# Patient Record
Sex: FEMALE | Race: WHITE | NOT HISPANIC OR LATINO | Employment: FULL TIME | URBAN - METROPOLITAN AREA
[De-identification: names, ages, dates, MRNs, and addresses within clinical notes are randomized per-mention and may not be internally consistent; named-entity substitution may affect disease eponyms.]

---

## 2019-04-15 ENCOUNTER — OFFICE VISIT (OUTPATIENT)
Dept: FAMILY MEDICINE | Facility: CLINIC | Age: 34
End: 2019-04-15
Payer: COMMERCIAL

## 2019-04-15 ENCOUNTER — NURSE TRIAGE (OUTPATIENT)
Dept: NURSING | Facility: CLINIC | Age: 34
End: 2019-04-15

## 2019-04-15 VITALS
WEIGHT: 233.2 LBS | SYSTOLIC BLOOD PRESSURE: 124 MMHG | OXYGEN SATURATION: 98 % | RESPIRATION RATE: 16 BRPM | HEIGHT: 70 IN | DIASTOLIC BLOOD PRESSURE: 78 MMHG | TEMPERATURE: 97.6 F | HEART RATE: 73 BPM | BODY MASS INDEX: 33.39 KG/M2

## 2019-04-15 DIAGNOSIS — Z12.4 SCREENING FOR MALIGNANT NEOPLASM OF CERVIX: ICD-10-CM

## 2019-04-15 DIAGNOSIS — Z13.6 CARDIOVASCULAR SCREENING; LDL GOAL LESS THAN 160: ICD-10-CM

## 2019-04-15 DIAGNOSIS — Z00.00 ENCOUNTER FOR ROUTINE ADULT HEALTH EXAMINATION WITHOUT ABNORMAL FINDINGS: Primary | ICD-10-CM

## 2019-04-15 DIAGNOSIS — Z13.1 SCREENING FOR DIABETES MELLITUS: ICD-10-CM

## 2019-04-15 DIAGNOSIS — L65.9 HAIR THINNING: ICD-10-CM

## 2019-04-15 LAB
ALBUMIN SERPL-MCNC: 3.7 G/DL (ref 3.4–5)
ALP SERPL-CCNC: 72 U/L (ref 40–150)
ALT SERPL W P-5'-P-CCNC: 30 U/L (ref 0–50)
ANION GAP SERPL CALCULATED.3IONS-SCNC: 6 MMOL/L (ref 3–14)
AST SERPL W P-5'-P-CCNC: 22 U/L (ref 0–45)
BILIRUB SERPL-MCNC: 0.4 MG/DL (ref 0.2–1.3)
BUN SERPL-MCNC: 11 MG/DL (ref 7–30)
CALCIUM SERPL-MCNC: 9.1 MG/DL (ref 8.5–10.1)
CHLORIDE SERPL-SCNC: 111 MMOL/L (ref 94–109)
CHOLEST SERPL-MCNC: 157 MG/DL
CO2 SERPL-SCNC: 25 MMOL/L (ref 20–32)
CREAT SERPL-MCNC: 0.76 MG/DL (ref 0.52–1.04)
GFR SERPL CREATININE-BSD FRML MDRD: >90 ML/MIN/{1.73_M2}
GLUCOSE SERPL-MCNC: 98 MG/DL (ref 70–99)
HBA1C MFR BLD: 5.3 % (ref 0–5.6)
HDLC SERPL-MCNC: 49 MG/DL
LDLC SERPL CALC-MCNC: 91 MG/DL
NONHDLC SERPL-MCNC: 108 MG/DL
POTASSIUM SERPL-SCNC: 4.2 MMOL/L (ref 3.4–5.3)
PROT SERPL-MCNC: 7.2 G/DL (ref 6.8–8.8)
SODIUM SERPL-SCNC: 142 MMOL/L (ref 133–144)
TRIGL SERPL-MCNC: 85 MG/DL
TSH SERPL DL<=0.005 MIU/L-ACNC: 2.42 MU/L (ref 0.4–4)

## 2019-04-15 PROCEDURE — 80053 COMPREHEN METABOLIC PANEL: CPT | Performed by: NURSE PRACTITIONER

## 2019-04-15 PROCEDURE — G0145 SCR C/V CYTO,THINLAYER,RESCR: HCPCS | Performed by: NURSE PRACTITIONER

## 2019-04-15 PROCEDURE — 84443 ASSAY THYROID STIM HORMONE: CPT | Performed by: NURSE PRACTITIONER

## 2019-04-15 PROCEDURE — 99385 PREV VISIT NEW AGE 18-39: CPT | Performed by: NURSE PRACTITIONER

## 2019-04-15 PROCEDURE — 87624 HPV HI-RISK TYP POOLED RSLT: CPT | Performed by: NURSE PRACTITIONER

## 2019-04-15 PROCEDURE — 83036 HEMOGLOBIN GLYCOSYLATED A1C: CPT | Performed by: NURSE PRACTITIONER

## 2019-04-15 PROCEDURE — 80061 LIPID PANEL: CPT | Performed by: NURSE PRACTITIONER

## 2019-04-15 PROCEDURE — 36415 COLL VENOUS BLD VENIPUNCTURE: CPT | Performed by: NURSE PRACTITIONER

## 2019-04-15 ASSESSMENT — ENCOUNTER SYMPTOMS
HEADACHES: 0
NAUSEA: 0
HEMATURIA: 0
COUGH: 0
ABDOMINAL PAIN: 0
WEAKNESS: 0
HEMATOCHEZIA: 0
DYSURIA: 0
PARESTHESIAS: 0
MYALGIAS: 0
SORE THROAT: 0
BREAST MASS: 0
JOINT SWELLING: 0
DIZZINESS: 0
FREQUENCY: 0
DIARRHEA: 0
NERVOUS/ANXIOUS: 0
HEARTBURN: 0
ARTHRALGIAS: 0
PALPITATIONS: 0
SHORTNESS OF BREATH: 0
FEVER: 0
EYE PAIN: 0
CONSTIPATION: 0
CHILLS: 0

## 2019-04-15 ASSESSMENT — MIFFLIN-ST. JEOR: SCORE: 1830.1

## 2019-04-15 NOTE — TELEPHONE ENCOUNTER
Rivka calls and says that she wants to know her lab results from today. RN then checked Epic and because the  Has not read the results yet, this nurse could not tell pt. Her results. RN told pt. That pt. Can call her clinic and have the DrElin Read the results, so pt. Can get the results. Pt. Voiced understanding.    Additional Information    Negative: [1] Caller is not with the adult (patient) AND [2] reporting urgent symptoms    Negative: Lab result questions    Negative: Medication questions    Negative: Caller cannot be reached by phone    Negative: Caller has already spoken to PCP or another triager    Negative: RN needs further essential information from caller in order to complete triage    Negative: Requesting regular office appointment    Negative: [1] Caller requesting NON-URGENT health information AND [2] PCP's office is the best resource    Negative: Health Information question, no triage required and triager able to answer question    Negative: General information question, no triage required and triager able to answer question    Negative: Question about upcoming scheduled test, no triage required and triager able to answer question    Negative: [1] Caller is not with the adult (patient) AND [2] probable NON-URGENT symptoms    [1] Follow-up call to recent contact AND [2] information only call, no triage required    Protocols used: [facility]

## 2019-04-15 NOTE — PROGRESS NOTES
SUBJECTIVE:   CC: Ashley R Engelmann is an 33 year old woman who presents for preventive health visit.     Healthy Habits:     Getting at least 3 servings of Calcium per day:  Yes    Bi-annual eye exam:  NO    Dental care twice a year:  Yes    Sleep apnea or symptoms of sleep apnea:  None    Diet:  Regular (no restrictions)    Frequency of exercise:  4-5 days/week    Duration of exercise:  30-45 minutes    Taking medications regularly:  Yes    Medication side effects:  None    PHQ-2 Total Score: 0    Additional concerns today:  No    Has had some hair loss but is noticing regrowth.  Recent stress includes school, work, getting .  Now just started trying to conceive.  She feels she has tolerated the stress well.    Today's PHQ-2 Score:   PHQ-2 ( 1999 [employer]) 4/15/2019   Q1: Little interest or pleasure in doing things 0   Q2: Feeling down, depressed or hopeless 0   PHQ-2 Score 0   Q1: Little interest or pleasure in doing things Not at all   Q2: Feeling down, depressed or hopeless Not at all   PHQ-2 Score 0       Abuse: Current or Past(Physical, Sexual or Emotional)- No  Do you feel safe in your environment? Yes    Social History     Tobacco Use     Smoking status: Never Smoker     Smokeless tobacco: Never Used   Substance Use Topics     Alcohol use: Not Currently         Alcohol Use 4/15/2019   Prescreen: >3 drinks/day or >7 drinks/week? No       Reviewed orders with patient.  Reviewed health maintenance and updated orders accordingly - Yes  There is no problem list on file for this patient.    History reviewed. No pertinent surgical history.    Social History     Tobacco Use     Smoking status: Never Smoker     Smokeless tobacco: Never Used   Substance Use Topics     Alcohol use: Not Currently     Family History   Family history unknown: Yes           Mammogram not appropriate for this patient based on age.    Pertinent mammograms are reviewed under the imaging tab.  History of abnormal Pap smear: NO - age  "30-65 PAP every 5 years with negative HPV co-testing recommended     Reviewed and updated as needed this visit by clinical staff  Tobacco  Allergies  Meds  Problems  Med Hx  Surg Hx  Fam Hx  Soc Hx          Reviewed and updated as needed this visit by Provider  Tobacco  Allergies  Meds  Problems  Med Hx  Surg Hx  Fam Hx            Review of Systems   Constitutional: Negative for chills and fever.   HENT: Negative for congestion, ear pain, hearing loss and sore throat.    Eyes: Negative for pain and visual disturbance.   Respiratory: Negative for cough and shortness of breath.    Cardiovascular: Negative for chest pain, palpitations and peripheral edema.   Gastrointestinal: Negative for abdominal pain, constipation, diarrhea, heartburn, hematochezia and nausea.   Breasts:  Negative for tenderness, breast mass and discharge.   Genitourinary: Negative for dysuria, frequency, genital sores, hematuria, pelvic pain, urgency, vaginal bleeding and vaginal discharge.   Musculoskeletal: Negative for arthralgias, joint swelling and myalgias.   Skin: Negative for rash.   Neurological: Negative for dizziness, weakness, headaches and paresthesias.   Psychiatric/Behavioral: Negative for mood changes. The patient is not nervous/anxious.           OBJECTIVE:   /78 (BP Location: Right arm, Patient Position: Chair, Cuff Size: Adult Large)   Pulse 73   Temp 97.6  F (36.4  C) (Tympanic)   Resp 16   Ht 1.765 m (5' 9.5\")   Wt 105.8 kg (233 lb 3.2 oz)   LMP 03/25/2019 (Exact Date)   SpO2 98%   BMI 33.94 kg/m    Physical Exam  GENERAL: healthy, alert and no distress  EYES: Eyes grossly normal to inspection, PERRL and conjunctivae and sclerae normal  HENT: ear canals and TM's normal, nose and mouth without ulcers or lesions  NECK: no adenopathy, no asymmetry, masses, or scars and thyroid normal to palpation  RESP: lungs clear to auscultation - no rales, rhonchi or wheezes  BREAST: normal without masses, tenderness " "or nipple discharge and no palpable axillary masses or adenopathy  CV: regular rate and rhythm, normal S1 S2, no S3 or S4, no murmur, click or rub, no peripheral edema and peripheral pulses strong  ABDOMEN: soft, nontender, no hepatosplenomegaly, no masses and bowel sounds normal   (female): normal female external genitalia, normal urethral meatus, vaginal mucosa pink, moist, well rugated, and normal cervix/adnexa/uterus without masses or discharge  NEURO: Normal strength and tone, mentation intact and speech normal  SKIN:  Thinning hair over crown, new growth seen along hair line.  PSYCH: mentation appears normal, affect normal/bright    Diagnostic Test Results:  none     ASSESSMENT/PLAN:   1. Encounter for routine adult health examination without abnormal findings  - Comprehensive metabolic panel    2. Screening for malignant neoplasm of cervix  - Pap imaged thin layer screen with HPV - recommended age 30 - 65  - HPV High Risk Types DNA Cervical    3. Screening for diabetes mellitus  - Comprehensive metabolic panel  - Hemoglobin A1c    4. CARDIOVASCULAR SCREENING; LDL GOAL LESS THAN 160  - Comprehensive metabolic panel  - Lipid panel reflex to direct LDL Fasting    5. Hair thinning  Likely due to stress, telogen effluvium discussed.  New growth seen.  Check TSH, monitor.  - TSH with free T4 reflex    COUNSELING:  Reviewed preventive health counseling, as reflected in patient instructions    BP Readings from Last 1 Encounters:   04/15/19 124/78     Estimated body mass index is 33.94 kg/m  as calculated from the following:    Height as of this encounter: 1.765 m (5' 9.5\").    Weight as of this encounter: 105.8 kg (233 lb 3.2 oz).      Weight management plan: Discussed healthy diet and exercise guidelines     reports that she has never smoked. She has never used smokeless tobacco.      Counseling Resources:  ATP IV Guidelines  Pooled Cohorts Equation Calculator  Breast Cancer Risk Calculator  FRAX Risk " Assessment  ICSI Preventive Guidelines  Dietary Guidelines for Americans, 2010  USDA's MyPlate  ASA Prophylaxis  Lung CA Screening    ALMA Valderrama Ra, CNP  [facility]

## 2019-04-17 LAB
COPATH REPORT: NORMAL
PAP: NORMAL

## 2019-04-19 LAB
FINAL DIAGNOSIS: ABNORMAL
HPV HR 12 DNA CVX QL NAA+PROBE: POSITIVE
HPV16 DNA SPEC QL NAA+PROBE: NEGATIVE
HPV18 DNA SPEC QL NAA+PROBE: NEGATIVE
SPECIMEN DESCRIPTION: ABNORMAL
SPECIMEN SOURCE CVX/VAG CYTO: ABNORMAL

## 2019-04-23 ENCOUNTER — RESULT FOLLOW UP (OUTPATIENT)
Dept: FAMILY MEDICINE | Facility: CLINIC | Age: 34
End: 2019-04-23

## 2019-04-23 DIAGNOSIS — R87.810 CERVICAL HIGH RISK HPV (HUMAN PAPILLOMAVIRUS) TEST POSITIVE: ICD-10-CM

## 2019-04-23 NOTE — PROGRESS NOTES
4/15/19 NIL, +HR HPV, not 16/18. Plan 1 yr co-test due by 4/15/20  4/23/19 Left message to call back. Patient has been notified of results and recommendations.  3/13/20 NIL pap, neg HR HPV. Plan 3 year cotest (adam)

## 2019-06-11 ENCOUNTER — PRENATAL OFFICE VISIT (OUTPATIENT)
Dept: NURSING | Facility: CLINIC | Age: 34
End: 2019-06-11
Payer: COMMERCIAL

## 2019-06-11 VITALS
DIASTOLIC BLOOD PRESSURE: 60 MMHG | WEIGHT: 236 LBS | SYSTOLIC BLOOD PRESSURE: 100 MMHG | HEART RATE: 72 BPM | BODY MASS INDEX: 33.79 KG/M2 | HEIGHT: 70 IN

## 2019-06-11 DIAGNOSIS — O09.511 SUPERVISION OF HIGH RISK PRIMIGRAVIDA IN FIRST TRIMESTER IN PATIENT 35 YEARS OR OLDER AT TIME OF DELIVERY: Primary | ICD-10-CM

## 2019-06-11 LAB
ERYTHROCYTE [DISTWIDTH] IN BLOOD BY AUTOMATED COUNT: 13.8 % (ref 10–15)
HCT VFR BLD AUTO: 36.7 % (ref 35–47)
HGB BLD-MCNC: 12.3 G/DL (ref 11.7–15.7)
MCH RBC QN AUTO: 30.3 PG (ref 26.5–33)
MCHC RBC AUTO-ENTMCNC: 33.5 G/DL (ref 31.5–36.5)
MCV RBC AUTO: 90 FL (ref 78–100)
PLATELET # BLD AUTO: 280 10E9/L (ref 150–450)
RBC # BLD AUTO: 4.06 10E12/L (ref 3.8–5.2)
WBC # BLD AUTO: 11.3 10E9/L (ref 4–11)

## 2019-06-11 PROCEDURE — 87591 N.GONORRHOEAE DNA AMP PROB: CPT | Performed by: ADVANCED PRACTICE MIDWIFE

## 2019-06-11 PROCEDURE — 99207 ZZC NO CHARGE NURSE ONLY: CPT

## 2019-06-11 PROCEDURE — 36415 COLL VENOUS BLD VENIPUNCTURE: CPT | Performed by: ADVANCED PRACTICE MIDWIFE

## 2019-06-11 PROCEDURE — 87491 CHLMYD TRACH DNA AMP PROBE: CPT | Performed by: ADVANCED PRACTICE MIDWIFE

## 2019-06-11 PROCEDURE — 86900 BLOOD TYPING SEROLOGIC ABO: CPT | Performed by: ADVANCED PRACTICE MIDWIFE

## 2019-06-11 PROCEDURE — 87340 HEPATITIS B SURFACE AG IA: CPT | Performed by: ADVANCED PRACTICE MIDWIFE

## 2019-06-11 PROCEDURE — 86780 TREPONEMA PALLIDUM: CPT | Performed by: ADVANCED PRACTICE MIDWIFE

## 2019-06-11 PROCEDURE — 86901 BLOOD TYPING SEROLOGIC RH(D): CPT | Performed by: ADVANCED PRACTICE MIDWIFE

## 2019-06-11 PROCEDURE — 85027 COMPLETE CBC AUTOMATED: CPT | Performed by: ADVANCED PRACTICE MIDWIFE

## 2019-06-11 PROCEDURE — 86850 RBC ANTIBODY SCREEN: CPT | Performed by: ADVANCED PRACTICE MIDWIFE

## 2019-06-11 PROCEDURE — 86762 RUBELLA ANTIBODY: CPT | Performed by: ADVANCED PRACTICE MIDWIFE

## 2019-06-11 PROCEDURE — 87086 URINE CULTURE/COLONY COUNT: CPT | Performed by: ADVANCED PRACTICE MIDWIFE

## 2019-06-11 PROCEDURE — 87389 HIV-1 AG W/HIV-1&-2 AB AG IA: CPT | Performed by: ADVANCED PRACTICE MIDWIFE

## 2019-06-11 RX ORDER — PRENATAL VIT/IRON FUM/FOLIC AC 27MG-0.8MG
1 TABLET ORAL DAILY
Qty: 90 TABLET | Refills: 3
Start: 2019-06-11

## 2019-06-11 SDOH — ECONOMIC STABILITY: FOOD INSECURITY: WITHIN THE PAST 12 MONTHS, THE FOOD YOU BOUGHT JUST DIDN'T LAST AND YOU DIDN'T HAVE MONEY TO GET MORE.: NEVER TRUE

## 2019-06-11 SDOH — ECONOMIC STABILITY: FOOD INSECURITY: WITHIN THE PAST 12 MONTHS, YOU WORRIED THAT YOUR FOOD WOULD RUN OUT BEFORE YOU GOT MONEY TO BUY MORE.: NEVER TRUE

## 2019-06-11 SDOH — ECONOMIC STABILITY: TRANSPORTATION INSECURITY
IN THE PAST 12 MONTHS, HAS LACK OF TRANSPORTATION KEPT YOU FROM MEETINGS, WORK, OR FROM GETTING THINGS NEEDED FOR DAILY LIVING?: NO

## 2019-06-11 SDOH — ECONOMIC STABILITY: TRANSPORTATION INSECURITY
IN THE PAST 12 MONTHS, HAS THE LACK OF TRANSPORTATION KEPT YOU FROM MEDICAL APPOINTMENTS OR FROM GETTING MEDICATIONS?: NO

## 2019-06-11 SDOH — ECONOMIC STABILITY: INCOME INSECURITY: HOW HARD IS IT FOR YOU TO PAY FOR THE VERY BASICS LIKE FOOD, HOUSING, MEDICAL CARE, AND HEATING?: NOT HARD AT ALL

## 2019-06-11 ASSESSMENT — MIFFLIN-ST. JEOR: SCORE: 1842.8

## 2019-06-11 NOTE — PROGRESS NOTES
"Chief Complaint   Patient presents with     Prenatal Care     New Prenatal Nurse Visit   7w1d  Estimated Date of Delivery: 2020      Initial /60 (BP Location: Right arm, Cuff Size: Adult Large)   Pulse 72   Ht 1.765 m (5' 9.5\")   Wt 107 kg (236 lb)   LMP 2019 (Exact Date)   BMI 34.35 kg/m   Estimated body mass index is 34.35 kg/m  as calculated from the following:    Height as of this encounter: 1.765 m (5' 9.5\").    Weight as of this encounter: 107 kg (236 lb).  BP completed using cuff size: regular    Questioned patient about current smoking habits.  Pt. has never smoked.    Prenatal book and folder (containing standard educational hand-outs and brochures) given to patient. Information in folder reviewed. Questions answered. Brochure given on optional screening available to assess chromosomal anomalies. Pt advised to call the clinic if she has any questions or concerns related to her pregnancy. Prenatal labs obtained. New prenatal visit scheduled on 19 with Renita Marrufo CNM.    7w1d    Lab Results   Component Value Date    PAP NIL 04/15/2019           Patient supplied answers from flow sheet for:  Prenatal OB Questionnaire.  Past Medical History  Diabetes?: No  Hypertension : No  Heart disease, mitral valve prolapse or rheumatic fever?: No  An autoimmune disease such as lupus or rheumatoid arthritis?: No  Kidney disease or urinary tract infection?: No  Epilepsy, seizures or spells?: No  Migraine headaches?: No  A stroke or loss of function or sensation?: No  Any other neurological problems?: No  Have you ever been treated for depression?: No  Are you having problems with crying spells or loss of self-esteem?: No  Have you ever required psychiatric care?: No  Have you ever had hepatitis, liver disease or jaundice?: No  Have you been treated for blood clots in your veins, deep vein thromosis, inflammation in the veins, thrombosis, phlebitis, pulmonary embolism or varicosities?: " No  Have you had excessive bleeding after surgery or dental work?: No  Do you bleed more than other women after a cut or scratch?: No  Do you have a history of anemia?: No  Have you ever had thyroid problems or taken thyroid medication?: No   Do you have any endocrine problems?: No  Have you ever been in a major accident or suffered serious trauma?: No  Within the last year, has anyone hit, slapped, kicked or otherwise hurt you?: No  In the last year, has anyone forced you to have sex when you didn't want to?: No    Past Medical History 2   Have you ever received a blood transfusion?: No  Would you refuse a blood transfusion if a doctor judged it to be medically necessary?: No   If you answered Yes, would you rather die than receive a blood transfusion?: Unknown  If you answered Yes, is this for Zoroastrianism reasons?: Unknown  Does anyone in your home smoke?: No  Do you use tobacco products?: No  Do you drink beer, wine or hard liquor?: No(not after + pregnancy test)  Do you use any of the following: marijuana, speed, cocaine, heroin, hallucinogens or other drugs?: No   Is your blood type Rh negative?: No  Have you ever had abnormal antibodies in your blood?: No  Have you ever had asthma?: No  Have you ever had tuberculosis?: No  Do you have any allergies to drugs or over-the-counter medications?: No  Allergies: Dust Mites, Aspartame, Ethanol, Venlafaxine, Hydrochloride, Sertraline: No  Have you had any breast problems?: No  Have you ever ?: No  Have you had any gynecological surgical procedures such as cervical conization, a LEEP procedure, laser treatment, cryosurgery of the cervix or a dilation and curettage, etc?: No  Have you ever had any other surgical procedures?: (see surgical history)  Have you been hospitalized for a nonsurgical reason excluding normal delivery?: No  Have you ever had any anesthetic complications?: No  Have you ever had an abnormal pap smear?: No    Past Medical History  (Continued)  Do you have a history of abnormalities of the uterus?: (pap normal, but tested + for HR HPV)  Did your mother take SHAHRAM or any other hormones when she was pregnant with you?: Unknown  Did it take you more than a year to become pregnant?: No  Have you ever been evaluated or treated for infertility?: No  Is there a history of medical problems in your family, which you feel may be important to this pregnancy?: Unknown  Do you have any other problems we have not asked about which you feel may be important to this pregnancy?: No    Symptoms since last menstrual period  Do you have any of the following symptoms: abdominal pain, blood in stools or urine, chest pain, shortness of breath, coughing or vomiting up blood, your heart racing or skipping beats, nausea and vomiting, pain on urination or vaginal discharge or bleed: No  Current medications, including over-the-counter medications, you are using? (If not applicable answer none): Prenatal vitamin  Will the patient be 35 years old or older at the time of delivery?: (!) Yes    Has the patient, baby's father or anyone in either family had:  Thalassemia (Italian, Greek, Mediterranean or  background only) and an MCV result less than 80?: No  Neural tube defect such as meningomyelocele, spina bifida or anencephaly?: No  Congenital heart defect?: No  Down's Syndrome?: No  Cj-Sachs disease (Adventist, Cajun, Croatian-Paraguayan)?: No  Sickle cell disease or trait ()?: No  Hemophilia or other inherited problems of blood?: No  Muscular dystrophy?: No  Cystic fibrosis?: No  [place]'s chorea?: No  Mental retardation/autism?: No  If yes, was the person tested for fragile X?: No  Any other inherited genetic or chromosomal disorder?: No  Maternal metabolic disorder (e.g Insulin-dependent diabetes, PKU)?: No  A child with birth defects not listed above?: No  Recurrent pregnancy loss or stillbirth?: No   Has the patient had any medications/street drugs/alcohol since  her last menstrual period?: No  Does the patient or baby's father have any other genetic risks?: No    Infection History   Do you object to being tested for Hepatitis B?: No  Do you object to being tested for HIV?: No   Do you feel that you are at high risk for coming in contact with the AIDS virus?: No  Have you ever been treated for tuberculosis?: No  Have you ever had a positive skin test for tuberculosis?: No  Do you live with someone who has tuberculosis?: No  Have you ever been exposed to tuberculosis?: No  Do you have genital herpes?: No  Does your partner have genital herpes?: No  Have you had a viral illness since your last period?: No  Have you ever had gonorrhea, chlamydia, syphilis, venereal warts, trichomoniasis, pelvic inflammatory disease or any other sexually transmitted disease?: (!) Yes  Do you know if you are a genital group B streptococcus carrier?: (+ HPV)  Have you had chicken pox/varicella?: (!) Yes   Have you been vaccinated against chicken Pox?: No  Have you had any other infectious diseases?: No    Christiana Dsouza RN

## 2019-06-12 LAB
ABO + RH BLD: NORMAL
ABO + RH BLD: NORMAL
BACTERIA SPEC CULT: NO GROWTH
BLD GP AB SCN SERPL QL: NORMAL
BLOOD BANK CMNT PATIENT-IMP: NORMAL
HBV SURFACE AG SERPL QL IA: NONREACTIVE
HIV 1+2 AB+HIV1 P24 AG SERPL QL IA: NONREACTIVE
SPECIMEN EXP DATE BLD: NORMAL
SPECIMEN SOURCE: NORMAL

## 2019-06-13 LAB
C TRACH DNA SPEC QL NAA+PROBE: NEGATIVE
N GONORRHOEA DNA SPEC QL NAA+PROBE: NEGATIVE
RUBV IGG SERPL IA-ACNC: 33 IU/ML
SPECIMEN SOURCE: NORMAL
SPECIMEN SOURCE: NORMAL
T PALLIDUM AB SER QL: NONREACTIVE

## 2019-06-20 DIAGNOSIS — O09.511 SUPERVISION OF HIGH RISK PRIMIGRAVIDA IN FIRST TRIMESTER IN PATIENT 35 YEARS OR OLDER AT TIME OF DELIVERY: ICD-10-CM

## 2019-06-26 ENCOUNTER — PRENATAL OFFICE VISIT (OUTPATIENT)
Dept: OBGYN | Facility: CLINIC | Age: 34
End: 2019-06-26
Payer: COMMERCIAL

## 2019-06-26 VITALS — DIASTOLIC BLOOD PRESSURE: 74 MMHG | WEIGHT: 234.6 LBS | BODY MASS INDEX: 34.15 KG/M2 | SYSTOLIC BLOOD PRESSURE: 102 MMHG

## 2019-06-26 DIAGNOSIS — Z34.01 SUPERVISION OF LOW-RISK FIRST PREGNANCY, FIRST TRIMESTER: Primary | ICD-10-CM

## 2019-06-26 DIAGNOSIS — O09.529 AMA (ADVANCED MATERNAL AGE) MULTIGRAVIDA 35+, UNSPECIFIED TRIMESTER: ICD-10-CM

## 2019-06-26 PROCEDURE — 99207 ZZC FIRST OB VISIT: CPT | Performed by: ADVANCED PRACTICE MIDWIFE

## 2019-06-26 NOTE — NURSING NOTE
"Chief Complaint   Patient presents with     Prenatal Care     NPN     Pap and G/C done       Initial /74 (BP Location: Left arm, Patient Position: Chair, Cuff Size: Adult Large)   Wt 106.4 kg (234 lb 9.6 oz)   LMP 2019 (Exact Date)   Breastfeeding? No   BMI 34.15 kg/m   Estimated body mass index is 34.15 kg/m  as calculated from the following:    Height as of 19: 1.765 m (5' 9.5\").    Weight as of this encounter: 106.4 kg (234 lb 9.6 oz).  BP completed using cuff size: large    Questioned patient about current smoking habits.  Pt. has never smoked.    9w2d      The following HM Due: NONE      +Some nausea and vomiting  +Fatigue    +Breast tenderness        Yoon Mckenzie, CMA on 2019 at 8:34 AM    "

## 2019-06-26 NOTE — PROGRESS NOTES
Ashley R Engelmann is a 33 year old  ,  who is not a previous CNM patient. She presents for a new OB Visit. This was a planned pregnancy.     FOB is Andrew who is in good health.  FOB IS actively involved in relationship and this pregnancy.    She has not had bleeding since her LMP.    She mild stretching abdominal pain since her LMP.   She has had nausea.  has had vomiting.  Any personal or family history of blood clots? Doesn't know, patient was adopted   History of sickle cell anemia or trait? Doesn't know          Patient's last menstrual period was 2019 (exact date)..  Estimated Date of Delivery: 2020 Ultrasound consistent with LMP.    MENSTRUAL HISTORY    frequency: every 28 days  Last PAP:  4/15/19  History of abnormal Pap?  Yes: +HPV    Health maintenance updated:  yes        Current medications are:    Current Outpatient Medications:      Prenatal Vit-Fe Fumarate-FA (PRENATAL MULTIVITAMIN W/IRON) 27-0.8 MG tablet, Take 1 tablet by mouth daily, Disp: 90 tablet, Rfl: 3     INFECTION HISTORY  HIV: No  Hepatitis B: No  Hepatitis C: No  Tuberculosis: No    Genital Herpes self: no  Herpes partner:  no  Chlamydia:  no  Gonorrhea:  no  HPV: No  BV:  No  Syphilis:  No  Chicken Pox:  Yes - Had had       OB HISTORY  OB History    Para Term  AB Living   1 0 0 0 0 0   SAB TAB Ectopic Multiple Live Births   0 0 0 0 0      # Outcome Date GA Lbr Jaciel/2nd Weight Sex Delivery Anes PTL Lv   1 Current                History of GDM: No,  PTL : No,  History of HTN in pregnancy: No,  Thrombocytopenia: No,  Shoulder dystocia: No,  Vacuum Extraction: No  PPH: No   3rd of 4th degree laceration: No.   Other complications: No    PERSONAL HISTORY  Exercise Habits:  walking 4-7 days per week.  Employment: Full time.  Her job involves light activity with little potential for toxic exposure.    Travel plans:  are intra [place] air travel.    Diet: follows a balanced nutrition diet  Abuse  concerns? Did not assess, patient  present   Hgb A1c screen:  BMI > 30: Yes, 1st degree family DM: No, History of GDM: No, PCOS: No, High risk ethnicity: No    Social History     Socioeconomic History     Marital status:      Spouse name: Andrew     Number of children: Not on file     Years of education: Not on file     Highest education level: Master's degree (e.g., MA, MS, Henrry, MEd, MSW, JOANN)   Occupational History     Occupation: RN     Employer: [employer]     Occupation: teacher     Employer: [employer]   Social Needs     Financial resource strain: Not hard at all     Food insecurity:     Worry: Never true     Inability: Never true     Transportation needs:     Medical: No     Non-medical: No   Tobacco Use     Smoking status: Never Smoker     Smokeless tobacco: Never Used   Substance and Sexual Activity     Alcohol use: Not Currently     Drug use: Never     Sexual activity: Yes     Partners: Male     Birth control/protection: None   Lifestyle     Physical activity:     Days per week: Not on file     Minutes per session: Not on file     Stress: Not on file   Relationships     Social connections:     Talks on phone: Not on file     Gets together: Not on file     Attends Holiness service: Not on file     Active member of club or organization: Not on file     Attends meetings of clubs or organizations: Not on file     Relationship status: Not on file     Intimate partner violence:     Fear of current or ex partner: Not on file     Emotionally abused: Not on file     Physically abused: Not on file     Forced sexual activity: Not on file   Other Topics Concern     Not on file   Social History Narrative     Not on file       She  reports that she has never smoked. She has never used smokeless tobacco.    STD testing offered?  Accepted  Last PHQ-9 score on record = No flowsheet data found.  Last GAD7 score on record = No flowsheet data found.  Alcohol Score = Not this pregnancy   Referral/Meds needed?  Yes, gave recommendation for unisom and B6    PAST MEDICAL/SURGICAL HISTORY  Past Medical History:   Diagnosis Date     Cervical high risk HPV (human papillomavirus) test positive 04/15/2019    See problem list     Past Surgical History:   Procedure Laterality Date     FRACTURE TX, WRIST RT/LT       TONSILLECTOMY & ADENOIDECTOMY         FAMILY HISTORY  Family History   Adopted: Yes   Problem Relation Age of Onset     Diabetes Other         was told H/O DM  birth mothers grandparents       ROS:  12 point review of systems negative other than symptoms noted below.    PHYSICAL EXAM  Vitals: /74 (BP Location: Left arm, Patient Position: Chair, Cuff Size: Adult Large)   Wt 106.4 kg (234 lb 9.6 oz)   LMP 2019 (Exact Date)   Breastfeeding? No   BMI 34.15 kg/m    BMI= Body mass index is 34.15 kg/m .     GENERAL:  33 year old pleasant pregnant female, alert, cooperative and well groomed.  NECK:  Thyroid without enlargement and nodules.  Lymph nodes not palpable.   LUNGS:  Clear to auscultation.  BREAST:  deferred   HEART:  RRR without murmur.  ABDOMEN: Soft without masses or tenderness.  No scars noted..  GENITALIA: deferred   CERVIX:  Deferred   UTERUS: likely Retroverted, nontender 9 weeks in size.  ADNEXA: Without masses or tenderness  RECTAL:  Normal appearance.  Digital exam deferred.  LOWER EXTREMITIES: No edema. No significant varicosities.    ASSESSMENT/PLAN:    IUP at 9w2d     consult for US for AMA patients: Yes reviewed risk and genetic screening   Genetic Testing reviewed and discussed, patient desires to discuss with , will return to clinic 1 week if decide to complete progenity. Handout provided    COUNSELING    Instructed on use of triage nurse line and contacting the on call CNM after hours in an emergency.     Symptoms of N&V and fatigue usually start to resolve around 12-16 weeks     Reviewed CNM philosophy, call schedule for labor and delivery, and [facility] for delivery    1st OB  handout given outlining appointment spacing and CNM information    Reviewed exercise and nutrition    Recommend to gain 20 pounds with her pregnancy.    Discussed OTC medications. OB med list given    Encouraged patient to arrange  if needed    Encouraged patient to take PNV's/DHA    Travel precautions discussed, no air travel after 36 weeks and Zika Virus discussed    Will call patient with lab results when available    F/U to be addressed next visit:  return to clinic 4 weeks, considering progenity consent signedt, Rx's given, referrals    Will return to the clinic in 4 weeks for her next routine prenatal check.  Will call to be seen sooner if problems arise.    Renita Marrufo, DNP, APRN, CNM, IBCLC

## 2019-07-02 ENCOUNTER — TELEPHONE (OUTPATIENT)
Dept: OBGYN | Facility: CLINIC | Age: 34
End: 2019-07-02

## 2019-07-02 DIAGNOSIS — Z34.01 SUPERVISION OF LOW-RISK FIRST PREGNANCY, FIRST TRIMESTER: Primary | ICD-10-CM

## 2019-07-02 NOTE — TELEPHONE ENCOUNTER
Future orders placed for CNM IgG and IgM labs. Okay to draw with [employer] (acquired by [employer]). Sent My chart message to notify patient.

## 2019-07-02 NOTE — TELEPHONE ENCOUNTER
Pt calling and requesting an order for CMV exposure labs. 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 She would like to have labs done to see if she has been exposed to it. She will be coming in for her progenity lab work on 7/8 and would like to have the lab drawn at that time if possible. Please advise.        Susana Lorenzo RN

## 2019-07-08 ENCOUNTER — ALLIED HEALTH/NURSE VISIT (OUTPATIENT)
Dept: NURSING | Facility: CLINIC | Age: 34
End: 2019-07-08
Payer: COMMERCIAL

## 2019-07-08 DIAGNOSIS — Z34.01 ENCOUNTER FOR SUPERVISION OF NORMAL FIRST PREGNANCY IN FIRST TRIMESTER: Primary | ICD-10-CM

## 2019-07-08 PROCEDURE — 99207 ZZC NO CHARGE NURSE ONLY: CPT

## 2019-07-08 PROCEDURE — 99000 SPECIMEN HANDLING OFFICE-LAB: CPT | Performed by: ADVANCED PRACTICE MIDWIFE

## 2019-07-08 PROCEDURE — 40001065 ZZHCL STATISTIC PREPARENT STANDARD PANEL: Mod: 90 | Performed by: ADVANCED PRACTICE MIDWIFE

## 2019-07-08 PROCEDURE — 40000791 ZZHCL STATISTIC VERIFI PRENATAL TRISOMY 21,18,13: Mod: 90 | Performed by: ADVANCED PRACTICE MIDWIFE

## 2019-07-08 PROCEDURE — 36415 COLL VENOUS BLD VENIPUNCTURE: CPT | Performed by: ADVANCED PRACTICE MIDWIFE

## 2019-07-09 DIAGNOSIS — Z34.01 SUPERVISION OF LOW-RISK FIRST PREGNANCY, FIRST TRIMESTER: ICD-10-CM

## 2019-07-09 PROCEDURE — 86644 CMV ANTIBODY: CPT | Performed by: ADVANCED PRACTICE MIDWIFE

## 2019-07-09 PROCEDURE — 36415 COLL VENOUS BLD VENIPUNCTURE: CPT | Performed by: ADVANCED PRACTICE MIDWIFE

## 2019-07-09 PROCEDURE — 86645 CMV ANTIBODY IGM: CPT | Performed by: ADVANCED PRACTICE MIDWIFE

## 2019-07-10 LAB
CMV IGG SERPL QL IA: 0.2 AI (ref 0–0.8)
CMV IGM SERPL QL IA: <0.2 AI (ref 0–0.8)

## 2019-07-12 LAB — LAB SCANNED RESULT: NORMAL

## 2019-07-15 ENCOUNTER — TELEPHONE (OUTPATIENT)
Dept: OBGYN | Facility: CLINIC | Age: 34
End: 2019-07-15

## 2019-07-15 NOTE — TELEPHONE ENCOUNTER
Results received from Progenity testing in [facility].  Testing done:  Innatal Prenatal Screen and Preparent Carrier Screen    Action:  Spoke to pt and gave NORMAL results.    Gender:**BOY**  Gender revealed via letter for pt to .    Routed to provider as WELLINGTON RAMOS RN

## 2019-07-18 LAB — LAB SCANNED RESULT: NORMAL

## 2019-07-31 ENCOUNTER — PRENATAL OFFICE VISIT (OUTPATIENT)
Dept: OBGYN | Facility: CLINIC | Age: 34
End: 2019-07-31
Payer: COMMERCIAL

## 2019-07-31 VITALS
BODY MASS INDEX: 34.79 KG/M2 | HEIGHT: 70 IN | SYSTOLIC BLOOD PRESSURE: 120 MMHG | WEIGHT: 243 LBS | DIASTOLIC BLOOD PRESSURE: 70 MMHG

## 2019-07-31 DIAGNOSIS — O99.712: ICD-10-CM

## 2019-07-31 DIAGNOSIS — Z34.01 SUPERVISION OF LOW-RISK FIRST PREGNANCY, FIRST TRIMESTER: Primary | ICD-10-CM

## 2019-07-31 PROCEDURE — 86645 CMV ANTIBODY IGM: CPT | Performed by: ADVANCED PRACTICE MIDWIFE

## 2019-07-31 PROCEDURE — 86747 PARVOVIRUS ANTIBODY: CPT | Mod: 90 | Performed by: ADVANCED PRACTICE MIDWIFE

## 2019-07-31 PROCEDURE — 36415 COLL VENOUS BLD VENIPUNCTURE: CPT | Performed by: ADVANCED PRACTICE MIDWIFE

## 2019-07-31 PROCEDURE — 86618 LYME DISEASE ANTIBODY: CPT | Performed by: ADVANCED PRACTICE MIDWIFE

## 2019-07-31 PROCEDURE — 86778 TOXOPLASMA ANTIBODY IGM: CPT | Mod: 90 | Performed by: ADVANCED PRACTICE MIDWIFE

## 2019-07-31 PROCEDURE — 99207 ZZC PRENATAL VISIT: CPT | Performed by: ADVANCED PRACTICE MIDWIFE

## 2019-07-31 PROCEDURE — 86777 TOXOPLASMA ANTIBODY: CPT | Mod: 90 | Performed by: ADVANCED PRACTICE MIDWIFE

## 2019-07-31 PROCEDURE — 99000 SPECIMEN HANDLING OFFICE-LAB: CPT | Performed by: ADVANCED PRACTICE MIDWIFE

## 2019-07-31 PROCEDURE — 86787 VARICELLA-ZOSTER ANTIBODY: CPT | Performed by: ADVANCED PRACTICE MIDWIFE

## 2019-07-31 PROCEDURE — 86644 CMV ANTIBODY: CPT | Performed by: ADVANCED PRACTICE MIDWIFE

## 2019-07-31 ASSESSMENT — MIFFLIN-ST. JEOR: SCORE: 1874.55

## 2019-07-31 NOTE — PROGRESS NOTES
"S:Feels better, body rash after visiting cabin 2 weeks ago. Noticed pruritic rash lower legs, abdomen, chest, back and buttocks, appears small patches 2-3 mm in diameter, some scabbed over. Has put hydrocortisone on with some relief. States no known exposures to new illness, bugs or environmental irritations. No new foods, did change prenatal vitamins but switched back since rash developed   Fetal movement No  Denies loss of fluid/vb/contractions/pelvic pain    O:  LMP 04/22/2019 (Exact Date)   /70   Ht 1.765 m (5' 9.5\")   Wt 110.2 kg (243 lb)   LMP 04/22/2019 (Exact Date)   BMI 35.37 kg/m      Exam:  Constitutional: healthy, alert and no distress  Respiratory: Respirations even and unlabored  Gastrointestinal: Abdomen soft, non-tender. Fundus measures appropriately for gestational age. Fetal heart tones heard easily.  Psychiatric: mentation appears normal and affect normal/bright  A: (Z34.01) Supervision of low-risk first pregnancy, first trimester  (primary encounter diagnosis)  Comment:  Plan: Lyme Disease Randa with reflex to WB Serum,         Parvovirus B19 antibodies IgG IgM, CMV Antibody        IgG, CMV antibody IgM, Toxoplasma gondii abys         IgG and IgM, Varicella Zoster Virus Antibody         IgG            (O99.712) Skin problem during pregnancy in second trimester  Comment: ordered  Plan: Lyme Disease Randa with reflex to WB Serum,         Parvovirus B19 antibodies IgG IgM, CMV Antibody        IgG, CMV antibody IgM, Toxoplasma gondii abys         IgG and IgM, Varicella Zoster Virus Antibody         IgG        TORCH labs ordered, eval etiology of rash        P: Discussed if labs neg, continue with hydrocortisone and benadryl for itching.  Seek PCP in 1 week if not improving   Anatomy ultrasound next visit between 20-22 weeks  Return to clinic 4 weeks    Renita Marrufo, DNP, APRN, CNM, IBCLC        "

## 2019-07-31 NOTE — NURSING NOTE
"Chief Complaint   Patient presents with     Prenatal Care       Initial /70   Ht 1.765 m (5' 9.5\")   Wt 110.2 kg (243 lb)   LMP 2019 (Exact Date)   BMI 35.37 kg/m   Estimated body mass index is 35.37 kg/m  as calculated from the following:    Height as of this encounter: 1.765 m (5' 9.5\").    Weight as of this encounter: 110.2 kg (243 lb).  BP completed using cuff size: regular    Questioned patient about current smoking habits.  Pt. has never smoked.          The following HM Due: NONE    Eder Paulino MA        "

## 2019-08-01 LAB
B BURGDOR IGG+IGM SER QL: 0.05 (ref 0–0.89)
CMV IGG SERPL QL IA: 0.2 AI (ref 0–0.8)
CMV IGM SERPL QL IA: <0.2 AI (ref 0–0.8)
VZV IGG SER QL IA: 2.1 AI (ref 0–0.8)

## 2019-08-02 ENCOUNTER — MYC MEDICAL ADVICE (OUTPATIENT)
Dept: OBGYN | Facility: CLINIC | Age: 34
End: 2019-08-02

## 2019-08-02 LAB
B19V IGG SER IA-ACNC: 6.29 IV
B19V IGM SER IA-ACNC: 0.11 IV
T GONDII IGG SER-ACNC: <3 IU/ML
T GONDII IGM SER-ACNC: <3 AU/ML

## 2019-08-12 ENCOUNTER — E-VISIT (OUTPATIENT)
Dept: FAMILY MEDICINE | Facility: CLINIC | Age: 34
End: 2019-08-12
Payer: COMMERCIAL

## 2019-08-12 DIAGNOSIS — Z11.1 SCREENING EXAMINATION FOR PULMONARY TUBERCULOSIS: Primary | ICD-10-CM

## 2019-08-12 PROCEDURE — 99444 ZZC PHYSICIAN ONLINE EVALUATION & MANAGEMENT SERVICE: CPT | Performed by: NURSE PRACTITIONER

## 2019-08-13 DIAGNOSIS — Z11.1 SCREENING EXAMINATION FOR PULMONARY TUBERCULOSIS: ICD-10-CM

## 2019-08-13 PROCEDURE — 36415 COLL VENOUS BLD VENIPUNCTURE: CPT | Performed by: NURSE PRACTITIONER

## 2019-08-13 PROCEDURE — 86481 TB AG RESPONSE T-CELL SUSP: CPT | Performed by: NURSE PRACTITIONER

## 2019-08-15 LAB
GAMMA INTERFERON BACKGROUND BLD IA-ACNC: 0.02 IU/ML
M TB IFN-G BLD-IMP: NEGATIVE
M TB IFN-G CD4+ BCKGRND COR BLD-ACNC: >10 IU/ML
MITOGEN IGNF BCKGRD COR BLD-ACNC: 0.01 IU/ML
MITOGEN IGNF BCKGRD COR BLD-ACNC: 0.01 IU/ML

## 2019-09-05 DIAGNOSIS — Z34.90 SUPERVISION OF NORMAL PREGNANCY: Primary | ICD-10-CM

## 2019-09-10 DIAGNOSIS — Z34.90 SUPERVISION OF NORMAL PREGNANCY: ICD-10-CM

## 2019-09-13 ENCOUNTER — TRANSCRIBE ORDERS (OUTPATIENT)
Dept: MATERNAL FETAL MEDICINE | Facility: CLINIC | Age: 34
End: 2019-09-13

## 2019-09-13 ENCOUNTER — TELEPHONE (OUTPATIENT)
Dept: OBGYN | Facility: CLINIC | Age: 34
End: 2019-09-13

## 2019-09-13 ENCOUNTER — MYC MEDICAL ADVICE (OUTPATIENT)
Dept: OBGYN | Facility: CLINIC | Age: 34
End: 2019-09-13

## 2019-09-13 DIAGNOSIS — O26.90 PREGNANCY RELATED CONDITION, ANTEPARTUM: Primary | ICD-10-CM

## 2019-09-13 DIAGNOSIS — R93.89 ABNORMAL ULTRASOUND: Primary | ICD-10-CM

## 2019-09-13 NOTE — TELEPHONE ENCOUNTER
Called patient and LM to call clinic back to review US results.    Shannen Cast CNM on 9/13/2019 at 10:04 AM

## 2019-09-13 NOTE — TELEPHONE ENCOUNTER
Spoke to patient on the phone regarding her ultrasound. Showed echogenic intracardiac focus and suboptimal views of outlfow tracts. M referral placed. Arlin CABRERAM

## 2019-09-17 ENCOUNTER — PRENATAL OFFICE VISIT (OUTPATIENT)
Dept: OBGYN | Facility: CLINIC | Age: 34
End: 2019-09-17
Payer: COMMERCIAL

## 2019-09-17 VITALS — SYSTOLIC BLOOD PRESSURE: 114 MMHG | DIASTOLIC BLOOD PRESSURE: 60 MMHG | BODY MASS INDEX: 35.81 KG/M2 | WEIGHT: 246 LBS

## 2019-09-17 DIAGNOSIS — Z34.02 PREGNANCY, SUPERVISION OF FIRST, SECOND TRIMESTER: Primary | ICD-10-CM

## 2019-09-17 PROCEDURE — 99207 ZZC PRENATAL VISIT: CPT | Performed by: ADVANCED PRACTICE MIDWIFE

## 2019-09-17 NOTE — PROGRESS NOTES
S: Feels well,  Has possible started feeling fetal movement. Anterior placenta  Denies uterine cramping, vaginal bleeding or leaking of fluid  O: Vitals: LMP 04/22/2019 (Exact Date)  /60   Wt 111.6 kg (246 lb)   LMP 04/22/2019 (Exact Date)   Breastfeeding? No   BMI 35.81 kg/m      BMI= There is no height or weight on file to calculate BMI.  Exam:  Constitutional: healthy, alert and no distress  Respiratory: respirations even and unlabored  Gastrointestinal: Abdomen soft, non-tender. Fundus measures appropriate for gestational age. Fetal heart tones hear without difficulty and within normal limits  : Deferred  Psychiatric: mentation appears normal and affect normal/bright  A:    Diagnosis Comments   1. Pregnancy, supervision of first, second trimester       P: Discussed 20 week fetal screen. M referral for EIF has appointment.   Encouraged patient to call with any questions or concerns.      Renita Marrufo, DNP, APRN, CNM, IBCLC

## 2019-09-17 NOTE — NURSING NOTE
"Chief Complaint   Patient presents with     Prenatal Care       Initial /60   Wt 111.6 kg (246 lb)   LMP 2019 (Exact Date)   Breastfeeding? No   BMI 35.81 kg/m   Estimated body mass index is 35.81 kg/m  as calculated from the following:    Height as of 19: 1.765 m (5' 9.5\").    Weight as of this encounter: 111.6 kg (246 lb).  BP completed using cuff size: regular    Questioned patient about current smoking habits.  Pt. has never smoked.          21w1d  + FM noted?  + mild headache  - heartburn  - cramping or bleeding  Mariam Jones LPN             "

## 2019-09-27 ENCOUNTER — PRE VISIT (OUTPATIENT)
Dept: MATERNAL FETAL MEDICINE | Facility: CLINIC | Age: 34
End: 2019-09-27

## 2019-10-01 ENCOUNTER — OFFICE VISIT (OUTPATIENT)
Dept: MATERNAL FETAL MEDICINE | Facility: CLINIC | Age: 34
End: 2019-10-01
Attending: ADVANCED PRACTICE MIDWIFE
Payer: COMMERCIAL

## 2019-10-01 ENCOUNTER — HOSPITAL ENCOUNTER (OUTPATIENT)
Dept: ULTRASOUND IMAGING | Facility: CLINIC | Age: 34
Discharge: HOME | End: 2019-10-01
Attending: ADVANCED PRACTICE MIDWIFE | Admitting: ADVANCED PRACTICE MIDWIFE
Payer: COMMERCIAL

## 2019-10-01 DIAGNOSIS — O09.512 ADVANCED MATERNAL AGE, 1ST PREGNANCY, SECOND TRIMESTER: Primary | ICD-10-CM

## 2019-10-01 DIAGNOSIS — O26.90 PREGNANCY RELATED CONDITION, ANTEPARTUM: ICD-10-CM

## 2019-10-01 DIAGNOSIS — O28.3 ECHOGENIC INTRACARDIAC FOCUS OF FETUS ON PRENATAL ULTRASOUND: ICD-10-CM

## 2019-10-01 PROCEDURE — 76811 OB US DETAILED SNGL FETUS: CPT

## 2019-10-01 NOTE — PROGRESS NOTES
Please see the imaging tab for details of the ultrasound performed today.    Bety Fermin MD  Specialist in Maternal-Fetal Medicine

## 2019-10-21 ENCOUNTER — PRENATAL OFFICE VISIT (OUTPATIENT)
Dept: OBGYN | Facility: CLINIC | Age: 34
End: 2019-10-21
Payer: COMMERCIAL

## 2019-10-21 VITALS
SYSTOLIC BLOOD PRESSURE: 102 MMHG | HEIGHT: 70 IN | DIASTOLIC BLOOD PRESSURE: 64 MMHG | WEIGHT: 247.7 LBS | BODY MASS INDEX: 35.46 KG/M2

## 2019-10-21 DIAGNOSIS — Z34.02 PREGNANCY, SUPERVISION OF FIRST, SECOND TRIMESTER: Primary | ICD-10-CM

## 2019-10-21 PROCEDURE — 90715 TDAP VACCINE 7 YRS/> IM: CPT | Performed by: ADVANCED PRACTICE MIDWIFE

## 2019-10-21 PROCEDURE — 90471 IMMUNIZATION ADMIN: CPT | Performed by: ADVANCED PRACTICE MIDWIFE

## 2019-10-21 PROCEDURE — 99207 ZZC PRENATAL VISIT: CPT | Performed by: ADVANCED PRACTICE MIDWIFE

## 2019-10-21 ASSESSMENT — MIFFLIN-ST. JEOR: SCORE: 1895.87

## 2019-10-21 NOTE — NURSING NOTE
"Chief Complaint   Patient presents with     Prenatal Care       Initial /64   Ht 1.765 m (5' 9.5\")   Wt 112.4 kg (247 lb 11.2 oz)   LMP 2019 (Exact Date)   BMI 36.05 kg/m   Estimated body mass index is 36.05 kg/m  as calculated from the following:    Height as of this encounter: 1.765 m (5' 9.5\").    Weight as of this encounter: 112.4 kg (247 lb 11.2 oz).  BP completed using cuff size: regular    Questioned patient about current smoking habits.  Pt. has never smoked.          The following HM Due: NONE      Eder Paulino MA        "

## 2019-10-21 NOTE — PROGRESS NOTES
"S: Feels well,  Baby active.  Denies uterine cramping, vaginal bleeding or leaking of fluid  O: Vitals: LMP 04/22/2019 (Exact Date)   /64   Ht 1.765 m (5' 9.5\")   Wt 112.4 kg (247 lb 11.2 oz)   LMP 04/22/2019 (Exact Date)   BMI 36.05 kg/m      BMI= There is no height or weight on file to calculate BMI.  Exam:  Constitutional: healthy, alert and no distress  Respiratory: respirations even and unlabored  Gastrointestinal: Abdomen soft, non-tender. Fundus measures appropriate for gestational age. Fetal heart tones hear without difficulty and within normal limits  : Deferred  Psychiatric: mentation appears normal and affect normal/bright  A:     ICD-10-CM    1. Pregnancy, supervision of first, second trimester Z34.02      P: GCT/repeat RPR today, handout provided.  Tdap given; reviewed CDC recommendations and partner/family vaccination recommended as well.  Need for Rhogam? No.   Encouraged patient to call with any questions or concerns.  CNM number given, 28 week folder given   Return to clinic 2 weeks    Renita Marrufo, TYLER, APRN, CNM, IBCLC                              "

## 2019-10-22 ENCOUNTER — ALLIED HEALTH/NURSE VISIT (OUTPATIENT)
Dept: NURSING | Facility: CLINIC | Age: 34
End: 2019-10-22
Payer: COMMERCIAL

## 2019-10-22 DIAGNOSIS — Z34.02 PREGNANCY, SUPERVISION OF FIRST, SECOND TRIMESTER: ICD-10-CM

## 2019-10-22 LAB
ERYTHROCYTE [DISTWIDTH] IN BLOOD BY AUTOMATED COUNT: 13 % (ref 10–15)
HCT VFR BLD AUTO: 33.2 % (ref 35–47)
HGB BLD-MCNC: 10.8 G/DL (ref 11.7–15.7)
MCH RBC QN AUTO: 30.4 PG (ref 26.5–33)
MCHC RBC AUTO-ENTMCNC: 32.5 G/DL (ref 31.5–36.5)
MCV RBC AUTO: 94 FL (ref 78–100)
PLATELET # BLD AUTO: 235 10E9/L (ref 150–450)
RBC # BLD AUTO: 3.55 10E12/L (ref 3.8–5.2)
WBC # BLD AUTO: 9.5 10E9/L (ref 4–11)

## 2019-10-22 PROCEDURE — 99207 ZZC NO CHARGE NURSE ONLY: CPT

## 2019-10-22 PROCEDURE — 82950 GLUCOSE TEST: CPT | Performed by: ADVANCED PRACTICE MIDWIFE

## 2019-10-22 PROCEDURE — 86780 TREPONEMA PALLIDUM: CPT | Performed by: ADVANCED PRACTICE MIDWIFE

## 2019-10-22 PROCEDURE — 85027 COMPLETE CBC AUTOMATED: CPT | Performed by: ADVANCED PRACTICE MIDWIFE

## 2019-10-22 PROCEDURE — 36415 COLL VENOUS BLD VENIPUNCTURE: CPT | Performed by: ADVANCED PRACTICE MIDWIFE

## 2019-10-22 NOTE — PROGRESS NOTES
Orders were placed by Renita and ordered as future, released for patient by the lab.   Swapna Ken CMA

## 2019-10-23 LAB — GLUCOSE 1H P 50 G GLC PO SERPL-MCNC: 153 MG/DL (ref 60–129)

## 2019-10-24 ENCOUNTER — DOCUMENTATION ONLY (OUTPATIENT)
Dept: LAB | Facility: CLINIC | Age: 34
End: 2019-10-24

## 2019-10-24 DIAGNOSIS — R73.09 ABNORMAL GLUCOSE TOLERANCE TEST: Primary | ICD-10-CM

## 2019-10-24 LAB — T PALLIDUM AB SER QL: NONREACTIVE

## 2019-10-29 DIAGNOSIS — R73.09 ABNORMAL GLUCOSE TOLERANCE TEST: ICD-10-CM

## 2019-10-29 PROCEDURE — 82952 GTT-ADDED SAMPLES: CPT | Performed by: ADVANCED PRACTICE MIDWIFE

## 2019-10-29 PROCEDURE — 82951 GLUCOSE TOLERANCE TEST (GTT): CPT | Performed by: ADVANCED PRACTICE MIDWIFE

## 2019-10-29 PROCEDURE — 36415 COLL VENOUS BLD VENIPUNCTURE: CPT | Performed by: ADVANCED PRACTICE MIDWIFE

## 2019-10-30 LAB
GLUCOSE 1H P 100 G GLC PO SERPL-MCNC: 160 MG/DL (ref 60–179)
GLUCOSE 2H P 100 G GLC PO SERPL-MCNC: 161 MG/DL (ref 60–154)
GLUCOSE 3H P 100 G GLC PO SERPL-MCNC: 91 MG/DL (ref 60–139)
GLUCOSE P FAST SERPL-MCNC: 90 MG/DL (ref 60–94)

## 2019-11-15 ENCOUNTER — PRENATAL OFFICE VISIT (OUTPATIENT)
Dept: OBGYN | Facility: CLINIC | Age: 34
End: 2019-11-15
Payer: COMMERCIAL

## 2019-11-15 VITALS — DIASTOLIC BLOOD PRESSURE: 68 MMHG | WEIGHT: 250 LBS | SYSTOLIC BLOOD PRESSURE: 106 MMHG | BODY MASS INDEX: 36.39 KG/M2

## 2019-11-15 DIAGNOSIS — Z23 NEED FOR PROPHYLACTIC VACCINATION AND INOCULATION AGAINST INFLUENZA: ICD-10-CM

## 2019-11-15 DIAGNOSIS — Z34.02 PREGNANCY, SUPERVISION OF FIRST, SECOND TRIMESTER: Primary | ICD-10-CM

## 2019-11-15 PROCEDURE — 99207 ZZC PRENATAL VISIT: CPT | Performed by: ADVANCED PRACTICE MIDWIFE

## 2019-11-15 PROCEDURE — 90471 IMMUNIZATION ADMIN: CPT | Performed by: ADVANCED PRACTICE MIDWIFE

## 2019-11-15 PROCEDURE — 90686 IIV4 VACC NO PRSV 0.5 ML IM: CPT | Performed by: ADVANCED PRACTICE MIDWIFE

## 2019-11-15 NOTE — NURSING NOTE
"Chief Complaint   Patient presents with     Prenatal Care     29w 4d     Imm/Inj     Flu Shot       Initial /68 (BP Location: Left arm, Cuff Size: Adult Large)   Wt 113.4 kg (250 lb)   LMP 2019 (Exact Date)   BMI 36.39 kg/m   Estimated body mass index is 36.39 kg/m  as calculated from the following:    Height as of 10/21/19: 1.765 m (5' 9.5\").    Weight as of this encounter: 113.4 kg (250 lb).  BP completed using cuff size: large    Questioned patient about current smoking habits.  Pt. has never smoked.          The following HM Due: NONE    Mary Head CMA    "

## 2019-11-29 ENCOUNTER — PRENATAL OFFICE VISIT (OUTPATIENT)
Dept: OBGYN | Facility: CLINIC | Age: 34
End: 2019-11-29
Payer: COMMERCIAL

## 2019-11-29 VITALS — WEIGHT: 251 LBS | SYSTOLIC BLOOD PRESSURE: 110 MMHG | BODY MASS INDEX: 36.53 KG/M2 | DIASTOLIC BLOOD PRESSURE: 70 MMHG

## 2019-11-29 DIAGNOSIS — Z34.03 PREGNANCY, SUPERVISION OF FIRST, THIRD TRIMESTER: Primary | ICD-10-CM

## 2019-11-29 PROCEDURE — 99207 ZZC PRENATAL VISIT: CPT | Performed by: ADVANCED PRACTICE MIDWIFE

## 2019-11-29 NOTE — NURSING NOTE
"Chief Complaint   Patient presents with     Prenatal Care     31w 4d no concerns       Initial /70 (BP Location: Left arm, Cuff Size: Adult Regular)   Wt 113.9 kg (251 lb)   LMP 2019 (Exact Date)   BMI 36.53 kg/m   Estimated body mass index is 36.53 kg/m  as calculated from the following:    Height as of 10/21/19: 1.765 m (5' 9.5\").    Weight as of this encounter: 113.9 kg (251 lb).  BP completed using cuff size: regular    Questioned patient about current smoking habits.  Pt. has never smoked.          The following HM Due: NONE  Mary Head CMA             "

## 2019-11-29 NOTE — PROGRESS NOTES
S: Feels well,  Baby active.  Denies uterine cramping, vaginal bleeding or leaking of fluid.  O: Vitals: LMP 04/22/2019 (Exact Date)   /70 (BP Location: Left arm, Cuff Size: Adult Regular)   Wt 113.9 kg (251 lb)   LMP 04/22/2019 (Exact Date)   BMI 36.53 kg/m      BMI= There is no height or weight on file to calculate BMI.  Exam:  Constitutional: healthy, alert and no distress  Respiratory: respirations even and unlabored  Gastrointestinal: Abdomen soft, non-tender. Fundus measures appropriate for gestational age. Fetal heart tones heard without difficulty and within normal limits  : Deferred  Psychiatric: mentation appears normal and affect normal/bright  A: (Z34.03) Pregnancy, supervision of first, third trimester  (primary encounter diagnosis)  Comment: wnl  Plan: return to clinic 2 weeks     P: Discussed plans for labor. Has CBE class scheduled. Has BP tour scheduled.   Discussed role of   Encouraged patient to call with any questions or concerns.  Return to clinic 2 weeks    Renita Marrufo, TYLER, APRN, CNM, IBCLC

## 2019-12-09 ENCOUNTER — PRENATAL OFFICE VISIT (OUTPATIENT)
Dept: OBGYN | Facility: CLINIC | Age: 34
End: 2019-12-09
Payer: COMMERCIAL

## 2019-12-09 VITALS — WEIGHT: 254.3 LBS | SYSTOLIC BLOOD PRESSURE: 106 MMHG | DIASTOLIC BLOOD PRESSURE: 68 MMHG | BODY MASS INDEX: 37.02 KG/M2

## 2019-12-09 DIAGNOSIS — Z34.03 PREGNANCY, SUPERVISION OF FIRST, THIRD TRIMESTER: Primary | ICD-10-CM

## 2019-12-09 PROCEDURE — 99207 ZZC PRENATAL VISIT: CPT | Performed by: ADVANCED PRACTICE MIDWIFE

## 2019-12-09 NOTE — NURSING NOTE
"Chief Complaint   Patient presents with     Prenatal Care       Initial /68 (BP Location: Left arm, Patient Position: Chair, Cuff Size: Adult Large)   Wt 115.3 kg (254 lb 4.8 oz)   LMP 2019 (Exact Date)   Breastfeeding No   BMI 37.02 kg/m   Estimated body mass index is 37.02 kg/m  as calculated from the following:    Height as of 10/21/19: 1.765 m (5' 9.5\").    Weight as of this encounter: 115.3 kg (254 lb 4.8 oz).  BP completed using cuff size: large    Questioned patient about current smoking habits.  Pt. has never smoked.    33w0d      The following HM Due: NONE        +FM daily   +Feeling well       Yoon Mckenzie, CMA on 2019 at 8:16 AM       "

## 2019-12-09 NOTE — PROGRESS NOTES
S: Feels well and has no concerns.  Baby active.  Denies uterine cramping, vaginal bleeding or leaking of fluid  O: Vitals: /68 (BP Location: Left arm, Patient Position: Chair, Cuff Size: Adult Large)   Wt 115.3 kg (254 lb 4.8 oz)   LMP 04/22/2019 (Exact Date)   Breastfeeding No   BMI 37.02 kg/m    BMI= Body mass index is 37.02 kg/m .  Exam:  Constitutional: healthy, alert and no distress  Respiratory: respirations even and unlabored  Gastrointestinal: Abdomen soft, non-tender. Fundus measures appropriate for gestational age. Fetal heart tones hear without difficulty and within normal limits  : Deferred  Psychiatric: mentation appears normal and affect normal/bright  A:     ICD-10-CM    1. Pregnancy, supervision of first, third trimester Z34.03      P: Discussed GBS screen for next visit. Discussed plans for labor.   Encouraged patient to call with any questions or concerns.  Return to clinic 2 weeks    ALMA Roberson, BEVERLY

## 2019-12-09 NOTE — PATIENT INSTRUCTIONS
Weeks 33 thru 36 - Gestational Age (Fetal Age - Weeks 31 thru 34):  This is about the time that the fetus will descend into the head down position preparing for birth. The fetus is beginning to gain weight more rapidly. The lanugo hair will disappear from the skin, and it is becoming less red and wrinkled. The fetus is now 16-19 inches and weighs anywhere from 5   lbs to 6   lbs.    GROUP B STREP    Group B Strep (GBS) is a common bacteria that is sometimes found in the vagina, urinary tract or rectum.  It is not harmful typically to adults but can cause serious illness in newborns.  It occasionally is passed from mother to baby during birth.   It is important to test in pregnancy.  When a woman is found to be positive for GBS, either at the first prenatal visit or by taking a culture at 36 weeks, treatment will be offered to reduce the chance of spreading the bacteria to the baby.     Treatment consists of either oral antibiotics early in pregnancy or antibiotics given by IV during labor if testing is positive at 36 weeks.    Even without treatment the baby rarely (1-2% of the time) gets infected.  With treatment the baby almost never gets infected.     There really isn't anything you can do to keep from getting or being positive for GBS.  It isn't sexually transmitted and there are no symptoms if you are positive.     Your midwife will discuss your results with you and make recommendations for treatment.

## 2019-12-23 ENCOUNTER — PRENATAL OFFICE VISIT (OUTPATIENT)
Dept: OBGYN | Facility: CLINIC | Age: 34
End: 2019-12-23
Payer: COMMERCIAL

## 2019-12-23 VITALS — DIASTOLIC BLOOD PRESSURE: 68 MMHG | BODY MASS INDEX: 36.97 KG/M2 | WEIGHT: 254 LBS | SYSTOLIC BLOOD PRESSURE: 108 MMHG

## 2019-12-23 DIAGNOSIS — Z34.03 PREGNANCY, SUPERVISION OF FIRST, THIRD TRIMESTER: Primary | ICD-10-CM

## 2019-12-23 PROCEDURE — 99207 ZZC PRENATAL VISIT: CPT | Performed by: ADVANCED PRACTICE MIDWIFE

## 2019-12-23 NOTE — PATIENT INSTRUCTIONS
"Labor Instructions for Midwife Patients    When to call:  Both during and after office hours call [phone]. There is a Nurse Midwife available to take your calls and answer your questions 24 hours a day.     When to call:  Call anytime you have important concerns about you or your baby.     Call if:    You are having contractions at regular intervals about 5-6 minutes apart lasting 30-60 seconds and becoming increasingly more intense     You have an uncontrollable gush of fluid from your vagina or feel a pop and gush like your water has broken    You have HEAVY bleeding, like heavy period, blood running down your legs, or  soaking a pad.     Some bleeding after a pelvic exam, after intercourse, or in labor when your cervix is dilating is normal and is referred to as \"bloody show\"    You have severe, continuous back or abdominal pain    You feel it is time to go to the hospital    If this is your first labor, call when contractions are very intense and have been about every 3-4 minutes for about an hour    If it is your second labor or more, call when contractions are strong and about every 3-5 minutes or sooner depending on your level of discomfort.     Keep in mind we are always here for you! If you have questions, concerns please don't hesitate to call us.     What to eat/drink in labor: Drink plenty of fluid (water most importantly, juice, soda or tea without caffeine). Eat rice, pasta, soup, cereal, bread/toast, and fruit. Avoid dairy and greasy food as they are difficult to digest and you may experience some nausea during labor.    Comfort measures:    Baths and showers (ok even with ruptured membranes, it may temporarily slow contractions if you are still in the early stage of labor)    Warm/hot packs for back pain or discomfort    Back, belly, or thigh massages    Standing, rocking, walking, leaning over bed or tables, side-lying and sleeping    Miscellaneous:     Contractions are timed from the beginning " of one to the beginning of the next    Try hard to sleep during the early stage of labor when you are not that uncomfortable. Timing of contractions at this point is not important    Even if you cannot sleep, resting in bed or on the couch can help you maintain your energy for labor    When you arrive at the hospital the nurse will check your baby's heartbeat, check your cervix, and will call us. The midwife on call will come in and be with you when you are in active labor    After hours you need to enter the hospital through the emergency room

## 2019-12-23 NOTE — NURSING NOTE
"Chief Complaint   Patient presents with     Prenatal Care       Initial /68 (BP Location: Right arm, Cuff Size: Adult Regular)   Wt 115.2 kg (254 lb)   LMP 2019 (Exact Date)   BMI 36.97 kg/m   Estimated body mass index is 36.97 kg/m  as calculated from the following:    Height as of 10/21/19: 1.765 m (5' 9.5\").    Weight as of this encounter: 115.2 kg (254 lb).  BP completed using cuff size: regular    Questioned patient about current smoking habits.  Pt. has never smoked.          Nahun Ozuna, SAQIB             "

## 2019-12-30 ENCOUNTER — PRENATAL OFFICE VISIT (OUTPATIENT)
Dept: OBGYN | Facility: CLINIC | Age: 34
End: 2019-12-30
Payer: COMMERCIAL

## 2019-12-30 VITALS
BODY MASS INDEX: 36.23 KG/M2 | SYSTOLIC BLOOD PRESSURE: 104 MMHG | DIASTOLIC BLOOD PRESSURE: 64 MMHG | WEIGHT: 253.1 LBS | HEIGHT: 70 IN

## 2019-12-30 DIAGNOSIS — Z34.03 ENCOUNTER FOR SUPERVISION OF NORMAL FIRST PREGNANCY IN THIRD TRIMESTER: Primary | ICD-10-CM

## 2019-12-30 PROCEDURE — 59426 ANTEPARTUM CARE ONLY: CPT | Performed by: ADVANCED PRACTICE MIDWIFE

## 2019-12-30 PROCEDURE — 87653 STREP B DNA AMP PROBE: CPT | Performed by: ADVANCED PRACTICE MIDWIFE

## 2019-12-30 PROCEDURE — 99207 ZZC PRENATAL VISIT: CPT | Performed by: ADVANCED PRACTICE MIDWIFE

## 2019-12-30 ASSESSMENT — MIFFLIN-ST. JEOR: SCORE: 1915.36

## 2019-12-30 NOTE — PROGRESS NOTES
"S: Feels well,  Baby active.  Denies uterine cramping, vaginal bleeding or leaking of fluid. No headache, increase in edema, no epigastric pain.   O: Vitals: /64   Ht 1.765 m (5' 9.5\")   Wt 114.8 kg (253 lb 1.6 oz)   LMP 04/22/2019 (Exact Date)   BMI 36.84 kg/m    BMI= Body mass index is 36.84 kg/m .  Exam:  Constitutional: healthy, alert and no distress  Respiratory: respirations even and unlabored  Gastrointestinal: Abdomen soft, non-tender. Fundus measures appropriate for gestational age. Fetal heart tones hear without difficulty and within normal limits  : Normal external genitalia without lesions  Psychiatric: mentation appears normal and affect normal/bright  A:     ICD-10-CM    1. Encounter for supervision of normal first pregnancy in third trimester Z34.03 Group B strep PCR     P: Cephalic presentation confirmed with limited bedside ultrasound today    Labor signs and symptoms discussed, aware of numbers to call  Discussed warning signs of PIH/preeclampsia and patient will monitor.  GBS screen completed. Discussed plans for labor.  Encouraged patient to call with any questions or concerns.  Return to clinic 1 weeks    Beverly White CNM, EVELINE-BC              "

## 2019-12-30 NOTE — NURSING NOTE
"36w0d    Chief Complaint   Patient presents with     Prenatal Care     GBS due       Initial /64   Ht 1.765 m (5' 9.5\")   Wt 114.8 kg (253 lb 1.6 oz)   LMP 2019 (Exact Date)   BMI 36.84 kg/m   Estimated body mass index is 36.84 kg/m  as calculated from the following:    Height as of this encounter: 1.765 m (5' 9.5\").    Weight as of this encounter: 114.8 kg (253 lb 1.6 oz).  BP completed using cuff size: regular    Questioned patient about current smoking habits.  Pt. has never smoked.          The following HM Due: NONE           "

## 2019-12-31 LAB
GP B STREP DNA SPEC QL NAA+PROBE: NEGATIVE
SPECIMEN SOURCE: NORMAL

## 2020-01-06 ENCOUNTER — PRENATAL OFFICE VISIT (OUTPATIENT)
Dept: OBGYN | Facility: CLINIC | Age: 35
End: 2020-01-06
Payer: COMMERCIAL

## 2020-01-06 ENCOUNTER — NURSE TRIAGE (OUTPATIENT)
Dept: NURSING | Facility: CLINIC | Age: 35
End: 2020-01-06

## 2020-01-06 VITALS
TEMPERATURE: 98.5 F | SYSTOLIC BLOOD PRESSURE: 116 MMHG | WEIGHT: 253 LBS | BODY MASS INDEX: 36.83 KG/M2 | DIASTOLIC BLOOD PRESSURE: 72 MMHG

## 2020-01-06 DIAGNOSIS — Z34.03 ENCOUNTER FOR SUPERVISION OF NORMAL FIRST PREGNANCY IN THIRD TRIMESTER: Primary | ICD-10-CM

## 2020-01-06 PROCEDURE — 99212 OFFICE O/P EST SF 10 MIN: CPT | Performed by: ADVANCED PRACTICE MIDWIFE

## 2020-01-06 NOTE — TELEPHONE ENCOUNTER
Caller is 37 weeks pregnant and is having a cough and cold symptoms for 2 days now. Caller states her fever is 99.0 orally. Patient is taking in adequate fluids and is urinating at least every 12 hours. Triage guidelines recommend to see provider within 24 hours. Caller verbalized and understands directives.    Reason for Disposition    [1] Fever > 100.4 F (38.0 C) with cold symptoms AND [2] lasts > 3 days    Additional Information    Negative: Shock suspected (e.g., cold/pale/clammy skin, too weak to stand, low BP, rapid pulse)    Negative: Difficult to awaken or acting confused (e.g., disoriented, slurred speech)    Negative: Rash with purple (or blood-colored) spots or dots    Negative: Sounds like a life-threatening emergency to the triager    Negative: [1] Abdominal pain AND [2] pregnant > 20 weeks    Negative: [1] Abdominal pain AND [2] pregnant < 20 weeks    Negative: Fever onset within 24 hours of receiving vaccine    Negative: Other symptom is present, see that guideline  (e.g., sore throat, earache, diarrhea, vomiting)    Negative: [1] Headache AND [2] stiff neck (can't touch chin to chest)    Negative: Difficulty breathing    Negative: IV drug abuse    Negative: Fever > 104 F (40 C)    Negative: [1] Fever > 100.0 F (37.8 C) AND [2] indwelling urinary catheter (e.g., Boggs, Coude)    Negative: [1] Fever > 100.0 F (37.8 C) AND [2] diabetes mellitus or weak immune system (e.g., HIV positive, cancer chemo, splenectomy, chronic steroids)    Negative: Painful urination or increased frequency of urination    Negative: [1] Drinking very little AND [2] dehydration suspected (e.g., no urine > 12 hours, very dry mouth, very lightheaded)    Negative: Patient sounds very sick or weak to the triager    Negative: Having contractions or other symptoms of labor    Negative: Leakage of fluid from vagina    Negative: [1] Pregnant 23 or more weeks AND [2] baby is moving less today (e.g., kick count < 5 in 1 hour or < 10 in 2  hours)    Negative: [1] Fever > 100.0 F (37.8 C) AND [2] foreign travel to a developing country in the last month    Negative: [1] Fever > 100.4 F (38.0 C) AND [2] NO cold symptoms (e.g., runny nose, cough)    Protocols used: PREGNANCY - FEVER-A-AH

## 2020-01-06 NOTE — PROGRESS NOTES
S: Feels ill. Reports symptoms of cough, congestion, and low-grade fever started over 2 days ago. States her friend was recently ill, but tested negative for influenza. Has questions about what medications are safe in pregnancy. Reports that baby seemed less active last evening, but did not do fetal kick counts. Movement normal today.  Denies uterine cramping, vaginal bleeding or leaking of fluid. No headache, increase in edema, no epigastric pain.   O: Vitals: /72 (BP Location: Right arm, Cuff Size: Adult Regular)   Temp 98.5  F (36.9  C) (Oral)   Wt 114.8 kg (253 lb)   LMP 04/22/2019 (Exact Date)   BMI 36.83 kg/m    BMI= Body mass index is 36.83 kg/m .  Exam:  Constitutional: healthy, alert and no distress  Respiratory: respirations even and unlabored  Gastrointestinal: Abdomen soft, non-tender. Fundus measures appropriate for gestational age. Fetal heart tones hear without difficulty and within normal limits  : Deferred  Psychiatric: mentation appears normal and affect normal/bright  A:   Encounter Diagnosis   Name Primary?     Encounter for supervision of normal first pregnancy in third trimester Yes     P: Offered rapid flu swab, but since patient is beyond 48 hour window of symptom onset, tamiflu would not be indicated. Patient declining swab.  Reviewed fetal kick counts.  Labor signs and symptoms discussed, aware of numbers to call  Discussed plans for labor. Interested in hydrotherapy, but open to all options.  Encouraged patient to call with any questions or concerns.  Return to clinic 1 weeks    Shannen Cast CNM on 1/6/2020 at 2:05 PM

## 2020-01-06 NOTE — NURSING NOTE
"Chief Complaint   Patient presents with     Prenatal Care     37w, c/o cough, congestion, and fever. +FM       Initial /72 (BP Location: Right arm, Cuff Size: Adult Regular)   Temp 98.5  F (36.9  C) (Oral)   Wt 114.8 kg (253 lb)   LMP 2019 (Exact Date)   BMI 36.83 kg/m   Estimated body mass index is 36.83 kg/m  as calculated from the following:    Height as of 19: 1.765 m (5' 9.5\").    Weight as of this encounter: 114.8 kg (253 lb).  BP completed using cuff size: regular long    Questioned patient about current smoking habits.  Pt. has never smoked.          The following HM Due: NONE    Mary Head CMA         "

## 2020-01-15 ENCOUNTER — PRENATAL OFFICE VISIT (OUTPATIENT)
Dept: OBGYN | Facility: CLINIC | Age: 35
End: 2020-01-15
Payer: COMMERCIAL

## 2020-01-15 VITALS — SYSTOLIC BLOOD PRESSURE: 126 MMHG | DIASTOLIC BLOOD PRESSURE: 80 MMHG | BODY MASS INDEX: 36.83 KG/M2 | WEIGHT: 253 LBS

## 2020-01-15 DIAGNOSIS — O36.8130 DECREASED FETAL MOVEMENTS IN THIRD TRIMESTER, SINGLE OR UNSPECIFIED FETUS: ICD-10-CM

## 2020-01-15 DIAGNOSIS — Z34.03 ENCOUNTER FOR SUPERVISION OF NORMAL FIRST PREGNANCY IN THIRD TRIMESTER: Primary | ICD-10-CM

## 2020-01-15 PROCEDURE — 59025 FETAL NON-STRESS TEST: CPT | Performed by: ADVANCED PRACTICE MIDWIFE

## 2020-01-15 PROCEDURE — 99207 ZZC PRENATAL VISIT: CPT | Performed by: ADVANCED PRACTICE MIDWIFE

## 2020-01-15 NOTE — NURSING NOTE
"Chief Complaint   Patient presents with     Prenatal Care       Initial LMP 2019 (Exact Date)  Estimated body mass index is 36.83 kg/m  as calculated from the following:    Height as of 19: 1.765 m (5' 9.5\").    Weight as of 20: 114.8 kg (253 lb).  BP completed using cuff size: regular    Questioned patient about current smoking habits.  Pt. has never smoked.          Nahun Ozuna CMA           "

## 2020-01-15 NOTE — PROGRESS NOTES
S:  Was sick getting better,  Baby active, states less than 2 weeks ago.  Denies uterine cramping, vaginal bleeding or leaking of fluid. No headache, increase in edema, no epigastric pain.   O: Vitals: /80 (BP Location: Left arm, Cuff Size: Adult Regular)   Wt 114.8 kg (253 lb)   LMP 04/22/2019 (Exact Date)   BMI 36.83 kg/m    BMI= Body mass index is 36.83 kg/m .  Exam:  Constitutional: healthy, alert and no distress  Respiratory: respirations even and unlabored  Gastrointestinal: Abdomen soft, non-tender. Fundus measures appropriate for gestational age. Fetal heart tones 140s heard without difficulty and within normal limits Cephalic per leopold's maneuver.   : Deferred  Psychiatric: mentation appears normal and affect normal/bright  A: (Z34.03) Encounter for supervision of normal first pregnancy in third trimester  (primary encounter diagnosis)  Comment: ordered   Plan: FETAL NON-STRESS TEST        (O36.8130) Decreased fetal movements in third trimester, single or unspecified fetus  Plan: FETAL NON-STRESS TEST     Reactive 140, + accelerations no decelerations, no contractions moderate variability       P: Labor signs and symptoms discussed, aware of numbers to call  Discussed warning signs of PIH/preeclampsia and patient will monitor.  GBS screen completed. Discussed plans for labor.   Reviewed counting fetal movement.  Encouraged patient to call with any questions or concerns.  Return to clinic 1 weeks    Renita Marrufo, TYLER, APRN, CNM, IBCLC

## 2020-01-18 ENCOUNTER — TELEPHONE (OUTPATIENT)
Facility: CLINIC | Age: 35
End: 2020-01-18

## 2020-01-19 ENCOUNTER — ANESTHESIA EVENT (OUTPATIENT)
Dept: OBGYN | Facility: CLINIC | Age: 35
End: 2020-01-19
Payer: COMMERCIAL

## 2020-01-19 ENCOUNTER — ANESTHESIA (OUTPATIENT)
Dept: OBGYN | Facility: CLINIC | Age: 35
End: 2020-01-19
Payer: COMMERCIAL

## 2020-01-19 ENCOUNTER — HOSPITAL ENCOUNTER (INPATIENT)
Facility: CLINIC | Age: 35
LOS: 2 days | Discharge: HOME | End: 2020-01-21
Attending: ADVANCED PRACTICE MIDWIFE | Admitting: ADVANCED PRACTICE MIDWIFE
Payer: COMMERCIAL

## 2020-01-19 DIAGNOSIS — Z37.9 VACUUM-ASSISTED VAGINAL DELIVERY: Primary | ICD-10-CM

## 2020-01-19 PROBLEM — Z36.89 ENCOUNTER FOR TRIAGE IN PREGNANT PATIENT: Status: ACTIVE | Noted: 2020-01-19

## 2020-01-19 PROBLEM — O09.523 MULTIGRAVIDA OF ADVANCED MATERNAL AGE IN THIRD TRIMESTER: Status: ACTIVE | Noted: 2020-01-19

## 2020-01-19 PROBLEM — O42.90 PREMATURE RUPTURE OF MEMBRANES: Status: ACTIVE | Noted: 2020-01-19

## 2020-01-19 LAB
ABO + RH BLD: NORMAL
ABO + RH BLD: NORMAL
BLD GP AB SCN SERPL QL: NORMAL
BLOOD BANK CMNT PATIENT-IMP: NORMAL
HGB BLD-MCNC: 11.2 G/DL (ref 11.7–15.7)
RUPTURE OF FETAL MEMBRANES BY ROM PLUS: POSITIVE
SPECIMEN EXP DATE BLD: NORMAL
T PALLIDUM AB SER QL: NONREACTIVE

## 2020-01-19 PROCEDURE — 25800030 ZZH RX IP 258 OP 636: Performed by: ADVANCED PRACTICE MIDWIFE

## 2020-01-19 PROCEDURE — 3E0R3BZ INTRODUCTION OF ANESTHETIC AGENT INTO SPINAL CANAL, PERCUTANEOUS APPROACH: ICD-10-PCS | Performed by: ANESTHESIOLOGY

## 2020-01-19 PROCEDURE — 25800030 ZZH RX IP 258 OP 636: Performed by: ANESTHESIOLOGY

## 2020-01-19 PROCEDURE — 25000125 ZZHC RX 250: Performed by: ADVANCED PRACTICE MIDWIFE

## 2020-01-19 PROCEDURE — 84112 EVAL AMNIOTIC FLUID PROTEIN: CPT | Performed by: ADVANCED PRACTICE MIDWIFE

## 2020-01-19 PROCEDURE — G0463 HOSPITAL OUTPT CLINIC VISIT: HCPCS

## 2020-01-19 PROCEDURE — 25000128 H RX IP 250 OP 636: Performed by: ADVANCED PRACTICE MIDWIFE

## 2020-01-19 PROCEDURE — 72200001 ZZH LABOR CARE VAGINAL DELIVERY SINGLE

## 2020-01-19 PROCEDURE — 59410 OBSTETRICAL CARE: CPT | Performed by: OBSTETRICS & GYNECOLOGY

## 2020-01-19 PROCEDURE — 85018 HEMOGLOBIN: CPT | Performed by: ADVANCED PRACTICE MIDWIFE

## 2020-01-19 PROCEDURE — 12000000 ZZH R&B MED SURG/OB

## 2020-01-19 PROCEDURE — 0KQM0ZZ REPAIR PERINEUM MUSCLE, OPEN APPROACH: ICD-10-PCS | Performed by: OBSTETRICS & GYNECOLOGY

## 2020-01-19 PROCEDURE — 25000132 ZZH RX MED GY IP 250 OP 250 PS 637: Performed by: ADVANCED PRACTICE MIDWIFE

## 2020-01-19 PROCEDURE — 86850 RBC ANTIBODY SCREEN: CPT | Performed by: ADVANCED PRACTICE MIDWIFE

## 2020-01-19 PROCEDURE — 00HU33Z INSERTION OF INFUSION DEVICE INTO SPINAL CANAL, PERCUTANEOUS APPROACH: ICD-10-PCS | Performed by: ANESTHESIOLOGY

## 2020-01-19 PROCEDURE — 86901 BLOOD TYPING SEROLOGIC RH(D): CPT | Performed by: ADVANCED PRACTICE MIDWIFE

## 2020-01-19 PROCEDURE — 86780 TREPONEMA PALLIDUM: CPT | Performed by: ADVANCED PRACTICE MIDWIFE

## 2020-01-19 PROCEDURE — 40000671 ZZH STATISTIC ANESTHESIA CASE

## 2020-01-19 PROCEDURE — 25000128 H RX IP 250 OP 636: Performed by: ANESTHESIOLOGY

## 2020-01-19 PROCEDURE — 37000011 ZZH ANESTHESIA WARD SERVICE

## 2020-01-19 PROCEDURE — 86900 BLOOD TYPING SEROLOGIC ABO: CPT | Performed by: ADVANCED PRACTICE MIDWIFE

## 2020-01-19 RX ORDER — EPHEDRINE SULFATE 50 MG/ML
5 INJECTION, SOLUTION INTRAMUSCULAR; INTRAVENOUS; SUBCUTANEOUS
Status: DISCONTINUED | OUTPATIENT
Start: 2020-01-19 | End: 2020-01-20

## 2020-01-19 RX ORDER — METHYLERGONOVINE MALEATE 0.2 MG/ML
200 INJECTION INTRAVENOUS
Status: DISCONTINUED | OUTPATIENT
Start: 2020-01-19 | End: 2020-01-20

## 2020-01-19 RX ORDER — NALOXONE HYDROCHLORIDE 0.4 MG/ML
.1-.4 INJECTION, SOLUTION INTRAMUSCULAR; INTRAVENOUS; SUBCUTANEOUS
Status: DISCONTINUED | OUTPATIENT
Start: 2020-01-19 | End: 2020-01-20

## 2020-01-19 RX ORDER — ONDANSETRON 2 MG/ML
4 INJECTION INTRAMUSCULAR; INTRAVENOUS EVERY 6 HOURS PRN
Status: DISCONTINUED | OUTPATIENT
Start: 2020-01-19 | End: 2020-01-20

## 2020-01-19 RX ORDER — SODIUM CHLORIDE, SODIUM LACTATE, POTASSIUM CHLORIDE, CALCIUM CHLORIDE 600; 310; 30; 20 MG/100ML; MG/100ML; MG/100ML; MG/100ML
INJECTION, SOLUTION INTRAVENOUS CONTINUOUS
Status: DISCONTINUED | OUTPATIENT
Start: 2020-01-19 | End: 2020-01-20

## 2020-01-19 RX ORDER — FENTANYL CITRATE 50 UG/ML
50-100 INJECTION, SOLUTION INTRAMUSCULAR; INTRAVENOUS
Status: DISCONTINUED | OUTPATIENT
Start: 2020-01-19 | End: 2020-01-20

## 2020-01-19 RX ORDER — OXYTOCIN/0.9 % SODIUM CHLORIDE 30/500 ML
1-24 PLASTIC BAG, INJECTION (ML) INTRAVENOUS CONTINUOUS
Status: DISCONTINUED | OUTPATIENT
Start: 2020-01-19 | End: 2020-01-20

## 2020-01-19 RX ORDER — HYDROXYZINE HYDROCHLORIDE 50 MG/1
100 TABLET, FILM COATED ORAL
Status: COMPLETED | OUTPATIENT
Start: 2020-01-19 | End: 2020-01-19

## 2020-01-19 RX ORDER — IBUPROFEN 800 MG/1
800 TABLET, FILM COATED ORAL
Status: COMPLETED | OUTPATIENT
Start: 2020-01-19 | End: 2020-01-19

## 2020-01-19 RX ORDER — OXYCODONE AND ACETAMINOPHEN 5; 325 MG/1; MG/1
1 TABLET ORAL
Status: DISCONTINUED | OUTPATIENT
Start: 2020-01-19 | End: 2020-01-20

## 2020-01-19 RX ORDER — CARBOPROST TROMETHAMINE 250 UG/ML
250 INJECTION, SOLUTION INTRAMUSCULAR
Status: DISCONTINUED | OUTPATIENT
Start: 2020-01-19 | End: 2020-01-20

## 2020-01-19 RX ORDER — OXYTOCIN/0.9 % SODIUM CHLORIDE 30/500 ML
100-340 PLASTIC BAG, INJECTION (ML) INTRAVENOUS CONTINUOUS PRN
Status: DISCONTINUED | OUTPATIENT
Start: 2020-01-19 | End: 2020-01-20

## 2020-01-19 RX ORDER — OXYTOCIN 10 [USP'U]/ML
10 INJECTION, SOLUTION INTRAMUSCULAR; INTRAVENOUS
Status: DISCONTINUED | OUTPATIENT
Start: 2020-01-19 | End: 2020-01-20

## 2020-01-19 RX ORDER — ACETAMINOPHEN 325 MG/1
650 TABLET ORAL EVERY 4 HOURS PRN
Status: DISCONTINUED | OUTPATIENT
Start: 2020-01-19 | End: 2020-01-20

## 2020-01-19 RX ORDER — NALBUPHINE HYDROCHLORIDE 10 MG/ML
2.5-5 INJECTION, SOLUTION INTRAMUSCULAR; INTRAVENOUS; SUBCUTANEOUS EVERY 6 HOURS PRN
Status: DISCONTINUED | OUTPATIENT
Start: 2020-01-19 | End: 2020-01-20

## 2020-01-19 RX ORDER — ONDANSETRON 4 MG/1
4 TABLET, ORALLY DISINTEGRATING ORAL EVERY 6 HOURS PRN
Status: DISCONTINUED | OUTPATIENT
Start: 2020-01-19 | End: 2020-01-20

## 2020-01-19 RX ADMIN — Medication: at 16:39

## 2020-01-19 RX ADMIN — HYDROXYZINE HYDROCHLORIDE 100 MG: 50 TABLET ORAL at 02:47

## 2020-01-19 RX ADMIN — Medication: at 09:19

## 2020-01-19 RX ADMIN — SODIUM CHLORIDE, POTASSIUM CHLORIDE, SODIUM LACTATE AND CALCIUM CHLORIDE: 600; 310; 30; 20 INJECTION, SOLUTION INTRAVENOUS at 16:49

## 2020-01-19 RX ADMIN — SODIUM CHLORIDE, POTASSIUM CHLORIDE, SODIUM LACTATE AND CALCIUM CHLORIDE: 600; 310; 30; 20 INJECTION, SOLUTION INTRAVENOUS at 09:44

## 2020-01-19 RX ADMIN — Medication 2 MILLI-UNITS/MIN: at 16:09

## 2020-01-19 RX ADMIN — IBUPROFEN 800 MG: 800 TABLET ORAL at 19:44

## 2020-01-19 RX ADMIN — FENTANYL CITRATE 100 MCG: 50 INJECTION, SOLUTION INTRAMUSCULAR; INTRAVENOUS at 07:45

## 2020-01-19 RX ADMIN — SODIUM CHLORIDE, POTASSIUM CHLORIDE, SODIUM LACTATE AND CALCIUM CHLORIDE 1000 ML: 600; 310; 30; 20 INJECTION, SOLUTION INTRAVENOUS at 08:32

## 2020-01-19 RX ADMIN — FENTANYL CITRATE 100 MCG: 50 INJECTION, SOLUTION INTRAMUSCULAR; INTRAVENOUS at 09:03

## 2020-01-19 RX ADMIN — LIDOCAINE HYDROCHLORIDE 20 ML: 10 INJECTION, SOLUTION EPIDURAL; INFILTRATION; INTRACAUDAL; PERINEURAL at 18:15

## 2020-01-19 NOTE — ANESTHESIA PREPROCEDURE EVALUATION
Anesthesia Pre-Procedure Evaluation    Patient: Rivka Xie   MRN: [id number] : 1985          Preoperative Diagnosis: * No surgery found *        Past Medical History:   Diagnosis Date     Cervical high risk HPV (human papillomavirus) test positive 04/15/2019    See problem list     Past Surgical History:   Procedure Laterality Date     FRACTURE TX, WRIST RT/LT       TONSILLECTOMY & ADENOIDECTOMY       Anesthesia Evaluation       history and physical reviewed .             ROS/MED HX    ENT/Pulmonary:  - neg pulmonary ROS     Neurologic:  - neg neurologic ROS     Cardiovascular:  - neg cardiovascular ROS       METS/Exercise Tolerance:     Hematologic:         Musculoskeletal:         GI/Hepatic:  - neg GI/hepatic ROS       Renal/Genitourinary:         Endo:         Psychiatric:         Infectious Disease:         Malignancy:         Other:                           obese      Physical Exam  Normal systems: cardiovascular and pulmonary    Airway   Mallampati: II    Dental     Cardiovascular       Pulmonary             Lab Results   Component Value Date    WBC 9.5 10/22/2019    HGB 11.2 (L) 2020    HCT 33.2 (L) 10/22/2019     10/22/2019     04/15/2019    POTASSIUM 4.2 04/15/2019    CHLORIDE 111 (H) 04/15/2019    CO2 25 04/15/2019    BUN 11 04/15/2019    CR 0.76 04/15/2019    GLC 98 04/15/2019    SAW 9.1 04/15/2019    ALBUMIN 3.7 04/15/2019    PROTTOTAL 7.2 04/15/2019    ALT 30 04/15/2019    AST 22 04/15/2019    ALKPHOS 72 04/15/2019    BILITOTAL 0.4 04/15/2019    TSH 2.42 04/15/2019       Preop Vitals  BP Readings from Last 3 Encounters:   20 112/69   01/15/20 126/80   20 116/72    Pulse Readings from Last 3 Encounters:   20 100   19 72   04/15/19 73      Resp Readings from Last 3 Encounters:   20 22   04/15/19 16    SpO2 Readings from Last 3 Encounters:   04/15/19 98%      Temp Readings from Last 1 Encounters:   20 97.3  F (36.3  C) (Oral)    Ht  "Readings from Last 1 Encounters:   12/30/19 1.765 m (5' 9.5\")      Wt Readings from Last 1 Encounters:   01/15/20 114.8 kg (253 lb)    Estimated body mass index is 36.83 kg/m  as calculated from the following:    Height as of 12/30/19: 1.765 m (5' 9.5\").    Weight as of 1/15/20: 114.8 kg (253 lb).       Anesthesia Plan      History & Physical Review      ASA Status:  2 .  OB Epidural Asa: 2            Postoperative Care      Consents  Anesthetic plan, risks, benefits and alternatives discussed with:  Patient..                 Tj Daniel MD                    .  "

## 2020-01-19 NOTE — PROGRESS NOTES
"CNM PROGRESS NOTE    SUBJECTIVE: Rivka Xie is uncomfortable with regular contractions and expresses difficulty coping stating \"I can't do this.\"  She was using nitrous oxide without relief. She had a few episodes of emesis and was able to change positions but did not appreciate relief with these changes. Discussed IV fentanyl including risks, benefits, and alternatives.  She agreed to IV fentanyl.  Following 15 minute post administration she experienced only slight relief.  She agreed to a cervical exam and was found to be 5/90/-2, mid to anterior. She decides to have an epidural.     OBJECTIVE:  /69   Pulse 100   Temp 97.4  F (36.3  C) (Oral)   Resp 22   LMP 2019 (Exact Date)     Patient laboring with difficulty coping.    Fetal heart tones: [employer] monitoring system, Baseline 145  Variability: moderate  Accelerations: present  Decelerations: Early decelerations.  Variable and late decelerations absent.     Contractions: Pt is kenneth every 2-4 minutes, lasting 50-60 seconds and palpates moderate    Cervix: 5/90/-2, mid to anterior  ROM: clear fluid.  2230 on 20.    Pitocin- none  Antibiotics- none  Cervical ripening: N/A    ASSESSMENT:  34 year old  with cowart IUP at 38w6d, Category 1 FHTs  Admitted for PROM.  Subsequently in spontaneous labor.   Active labor  PROM.  SROM at 2230 on 20.  ROM x 10 hours  Clear fluid, bloody show  2 cervical exams since ROM  GBS negative  AMA  Elevated GCT, passed 3 hr GTT  O + blood type     PLAN:   Epidural placement  Minimize cervical exams  CNM support  Position changes to encourage fetal rotation and descent  Anticipate     ALMA Salas, [facility]    30 minutes  "

## 2020-01-19 NOTE — TELEPHONE ENCOUNTER
Telephone encounter: I returned patient call.  She reports getting out of the bath tub this evening and noticed a trickle of fluid running down her leg.  She then sat on the toilet and noticed fluid come out that was not urine and stated that it was not the consistency of urine. She placed a pad in her underwear and when she checked it later found it to be wet.  She denies contractions.  She reports active fetal movement.  Patient states she will arrive in 15-20 minutes.    ALMA Salas, [facility]

## 2020-01-19 NOTE — PROVIDER NOTIFICATION
01/19/20 0824   Provider Notification   Provider Name/Title Nalini Cast   Method of Notification In Department   Notification Reason Labor Status   CNM stated pt may have labor epidural prn.

## 2020-01-19 NOTE — PROVIDER NOTIFICATION
01/19/20 0723   Provider Notification   Provider Name/Title Malissa Smith   Method of Notification At Bedside   Notification Reason Labor Status   CNM bedside helping pt with labor.

## 2020-01-19 NOTE — PROVIDER NOTIFICATION
01/19/20 0935   Labor Care Interventions   Labor Interventions fetal heart rate monitored;fetal spiral electrode applied;intravenous fluid bolus administered;oxygen therapy initiated   CNM bedside during decel. FSE placed, pt positioned to left side then right side.

## 2020-01-19 NOTE — PROGRESS NOTES
[facility] PROGRESS NOTE    SUBJECTIVE:  Still very comfortable with epidural. Was able to sleep a few hours    OBJECTIVE:  /57   Pulse 100   Temp 98.5  F (36.9  C) (Oral)   Resp 18   LMP 2019 (Exact Date)   SpO2 96%     Fetal heart tones: Baseline 144   Variability: moderate  Accelerations: present  Decelerations: absent    Contractions: Pt is kenneth every 3-4 minutes, lasting 45 seconds and palpates strong    Cervix: 10/ 100% / +2, Vtx  ROM: clear fluid    Pitocin- none  Antibiotics- none  Cervical ripening: N/A    ASSESSMENT:  IUP @ 38w6d second stage labor and good progress   GBS- negative     PLAN:     Anticipate     TIMMY VEGA CNM

## 2020-01-19 NOTE — H&P
[facility] Labor Admission History & Physical    Rivka Xie is a 34 year old  with an IUP at 38w6d  ; .    Partner/support Person: Andrew  Language: English  Clinic: [facility] clinic  Provider: ALMA Salas CNM    Rivka Xie is admitted to the Birthplace at [facility] on 2020 at 1:19 AM       History of present inllness/Chief Complaint:  Rivka Xie is a 35 yo  at 38w 6d who called with suspicion for SROM.  On admission she appears grossly ruptured.   ROM + was collected and sent to confirm. Patient reports that she has had no contractions. She reports active fetal movement.     Membranes grossly ruptured with clear fluid since  on 20  Bloody show No   Any changes with medical history since last prenatal visit: No    Counseled patient regarding options for IOL now versus waiting 12-24 hours.  Discussed with patient that approximately 65% of women will go into spontaneous labor by 12 hours post ROM and approximately 85% will have spontaneous labor by 24 hours.  Reviewed with her that she has clear fluid, is GBS negative, and her baby's EFM tracing is category 1(described this to her as a good and reassuring tracing).  Discussed that infection risk is a factor and is a reason for IOL now rather than waiting.  Also discussed that we would minimize exams to reduce her risk of infection and that we will monitor her for developing infection. Risks, benefits and alternatives to initiating IOL with IV pitocin now or waiting 12 hours to see if labor will begin independently discussed. Questions answered.  Patient elects to see if labor will come spontaneously and sleep overnight.          Obstetrical history  Estimated Date of Delivery: 2020 determined by first trimester ultrasound.  Patient's last menstrual period was 2019 (exact date).   Dating U/S: 9/10/19    Fetal anatomic survey: Abnormal: EIF  Placenta: Anterior, no previa    Follow up  ultrasound at [facility] on 10/1/19.  See impression below:                                                    IMPRESSION  ---------------------------------------------------------------------------------------------------------  1) Intrauterine pregnancy at 23 1/7 weeks gestational age.  2) There is a left echogenic intracardiac focus.  3) None of the anomalies commonly detected by ultrasound were evident in the detailed fetal anatomic survey described above.  4) Growth parameters and estimated fetal weight were consistent with an appropriate for gestation age pattern of growth.  5) The amniotic fluid volume appeared normal.         PRENATAL COURSE  Prenatal care began at 7 wks 1 day gestation for a total of 12 prenatal visits.  Pre gravid BMI: 33.93. Total wt gain 20 lb.   Prenatal Blood Pressure: WNL  Prenatal course was essentially uncomplicated  Tdap:10/21/19  Rhogam:N/A    Patient Active Problem List   Diagnosis     Cervical high risk HPV (human papillomavirus) test positive     Encounter for triage in pregnant patient     Premature rupture of membranes     Multigravida of advanced maternal age in third trimester       HISTORY  No Known Allergies  Past Medical History:   Diagnosis Date     Cervical high risk HPV (human papillomavirus) test positive 04/15/2019    See problem list     Past Surgical History:   Procedure Laterality Date     FRACTURE TX, WRIST RT/LT       TONSILLECTOMY & ADENOIDECTOMY       Family History   Adopted: Yes   Problem Relation Age of Onset     Diabetes Other         was told H/O DM  birth mothers grandparents     Social History     Tobacco Use     Smoking status: Never Smoker     Smokeless tobacco: Never Used   Substance Use Topics     Alcohol use: Not Currently     OB History    Para Term  AB Living   1 0 0 0 0 0   SAB TAB Ectopic Multiple Live Births   0 0 0 0 0      # Outcome Date GA Lbr Jaciel/2nd Weight Sex Delivery Anes PTL Lv   1 Current                LABS:  Lab Results    Component Value Date    ABO O 2019    RH Pos 2019    AS Neg 2019    HGB 10.8 (L) 10/22/2019    HEPBANG Nonreactive 2019    CHPCRT Negative 2019    GCPCRT Negative 2019       GBS Status:   Lab Results   Component Value Date    GBS Negative 2019     Rubella: Immune  HIV: Non-Reactive  Platelets: 280 on 19  1hr GCT:  153  3 hr GTT: Pass  Hgb A1c 5.3 on 4/15/19    ROS   Pt is alert and oriented  Pt denies significant constitutional symptoms including fever and/or malaise.    Pt denies significant respiratory, cardiovacular, GI, or muscular/skeletal complaints.    Neuro: Denies HA and visual changes  Muscoloskeletal: Denies except for discomforts r/t pregnancy     PHYSICAL EXAM:  /89   Pulse 100   Temp 98  F (36.7  C) (Oral)   Resp 20   LMP 2019 (Exact Date)   General appearance:  healthy, alert, active and no distress   Heart: RRR  Lungs: CTA bilaterally, normal respiratory effort  Abdomen: gravid, single vertex fetus, non-tender, EFW < 4500 grams   Legs: reflexes 2+ bilaterally, active ROM    Contractions: None    Fetal heart tones: Baseline 145  Variability: moderate   Accelerations: present  Decelerations: absent    NST: reactive    Cervix: Exam deferred  Membranes: Ruptured, clear fluid, no bloody show    ASSESSMENT:  34 year old  with cowart IUP at 38w6d, not in labor  PROM.  SROM at 2230 on 20  Category 1 FHTs  GBS negative and membranes ruptured for 3 hours  AMA  Elevated GCT, passed 3 hr GTT  O + blood type    PLAN:  Send ROM +   Minimize cervical exams due to ROM  Discussed risks, benefits and alternatives to initiating IOL with IV pitocin now or waiting 12 hours to see if labor will begin independently. Patient elects to wait and sleep overnight.CNM to reevaluate in the morning.    Routine CNM care  Labs ordered: Hemoglobin and type and screen  Counseled patient regarding comfort measures, pain management options, and labor  processes.      ALMA Salas, CNM    1/19/20    30 minutes

## 2020-01-19 NOTE — PROVIDER NOTIFICATION
01/19/20 0839   Provider Notification   Provider Name/Title Dr Daniel   Method of Notification Phone   Updated MD that epidural orders are for 1400 and need them for now, MD gave TORB to change orders for now.

## 2020-01-19 NOTE — PROGRESS NOTES
CNM PROGRESS NOTE    SUBJECTIVE:  Much more comfortable after epidural. After espana placement and cervical exam fetal heart tones down to 70s for ~ 6 minures. Patient repositioned, O2 applied and decel resolved. FSE applied.    OBJECTIVE:  /69   Pulse 100   Temp 97.3  F (36.3  C) (Oral)   Resp 22   LMP 2019 (Exact Date)     Fetal heart tones: Baseline 144   Variability: moderate  Accelerations: present  Decelerations: present, decel to 70s earlier    Contractions: Pt is kenneth every 3 minutes, lasting 60 seconds and palpates moderate    Cervix: 6/ 90% / -2, Vtx  ROM: clear fluid    Pitocin- none  Antibiotics- none  Cervical ripening: N/A    ASSESSMENT:  IUP @ 38w6d active labor   GBS- negative     PLAN:     Anticipate   reevaluate in 2-4 hours/PRN    TIMMY VEGA CNM

## 2020-01-19 NOTE — ANESTHESIA PROCEDURE NOTES
Peripheral nerve/Neuraxial procedure note : epidural catheter  Pre-Procedure  Performed by Tj Daniel MD  Referred by GARY  Location: OB    Procedure Times:1/19/2020 8:57 AM and 1/19/2020 9:10 AM  Pre-Anesthestic Checklist: patient identified, IV checked, site marked, risks and benefits discussed, informed consent, monitors and equipment checked, pre-op evaluation and at physician/surgeon's request    Timeout  Correct Patient: Yes   Correct Procedure: Yes   Correct Site: Yes   Correct Laterality: Yes and N/A   Correct Position: Yes   Site Marked: Yes, N/A   .   Procedure Documentation    .    Procedure: epidural catheter, .       .  .        Assessment/Narrative  .  .  . Comments:  Pre-Procedure  Performed by Tj Daniel MD  Location: OB.      PreAnesthestic Checklist: patient identified, IV checked, risks and benefits discussed, informed consent obtained, monitors and equipment checked, pre-op evaluation and at physician/surgeon's request.    Timeout   Correct Patient: Yes  Correct Procedure: Epidural catheter placement  Correct Site: Yes   Correct Position: Yes    Procedure Documentation  Procedure:   Epidural catheter block for Labor    Patient currently in labor and she and OBMD request a labor epidural to control her labor pains. Patient was interviewed and examined. Procedure and risks including but not limited to bleeding, infection, nerve injury, paralysis, PDPH, and inadequate block requiring intervention discussed with patient. Questions answered. This epidural is to be placed in anticipation of vaginal delivery.  She consents to the epidural procedure.  Time-out was performed.  I or my partners remain immediately available for management of any issues or complications and will monitor at appropriate intervals.  Procedure: Patient sitting. Betadine prep x 3. Sterile drape applied.  Mask and gloves used.  Lidocaine 1%  local infiltration at L 3-4.  17 G. Tuohy needle at L3-4 by loss of  resistance into epidural space.  No CSF, paresthesia or blood. 1.5 % Lidocaine with 1:200,000 Epinephrine 5cc test dose. Epidural catheter inserted w/o resistance to 5 cm in epidural space. Then 0.25% bupivicaine 10 cc with NS 5 cc.  Aspiration negative for blood and CSF.   Negative for neuro change, paresthesia or symptoms of intravascular injection or intrathecal injection.  Infusion orders written and infusion started.    Tj Daniel MD

## 2020-01-19 NOTE — PROGRESS NOTES
[facility] PROGRESS NOTE    SUBJECTIVE:  Patient sleeping soundly with relief from epidural.     OBJECTIVE:  /66   Pulse 100   Temp 98.6  F (37  C) (Oral)   Resp 22   LMP 2019 (Exact Date)   SpO2 96%     Fetal heart tones: Baseline 144   Variability: moderate now, has had some periods of minimal variability  Accelerations: present  Decelerations: absent    Contractions: Pt is kenneth every 3-4 minutes, lasting 45 seconds and palpates moderate    Cervix: deferred  ROM: clear fluid    Pitocin- none  Antibiotics- none  Cervical ripening: N/A    ASSESSMENT:  IUP @ 38w6d active labor   GBS- negative     PLAN:     Anticipate   reevaluate in 2/PRN    TIMMY VEGA CNM

## 2020-01-19 NOTE — PLAN OF CARE
Hydroxyzine given for sleep @ 0247. Up in room at 0400 with increased cramping, breathing exercises utilized. EFM on. Category 1 FHR tracing. Contractions felt low in abdomen, not picking up on monitor. To shower, requests tub. CNM notified, ok for hydrotherapy and to defer vaginal exam.

## 2020-01-19 NOTE — PLAN OF CARE
"Data: Patient presented to Birthplace: 2020 12:21 AM.  Reason for maternal/fetal assessment is leaking vaginal fluid. Patient reports leaking noticed at 2230, clear fluid.  Patient is a .  Prenatal record reviewed. Pregnancy  has been complicated by AMA.  Gestational Age 38w6d. VSS. Fetal movement present. Patient denies uterine contractions, vaginal bleeding, abdominal pain, nausea, vomiting, headache, visual disturbances, epigastric or URQ pain, significant edema. Support person Andrew is present.   Action: Verbal consent for EFM. Triage assessment completed. Bill of rights reviewed. ROM plus sent and positive. Valor [facility] at bedside @ 9825. Category 1 FHR tracing. No contractions but pt feels some \"cramping\". Orders for admission received from [facility]. Vaginal exam deferred. Will do temp every 4 hrs while asleep.  Response: Patient verbalized agreement with plan. Admitted to [facility]. Oriented to room and unit.   "

## 2020-01-20 PROCEDURE — 12000000 ZZH R&B MED SURG/OB

## 2020-01-20 PROCEDURE — 25000132 ZZH RX MED GY IP 250 OP 250 PS 637: Performed by: OBSTETRICS & GYNECOLOGY

## 2020-01-20 PROCEDURE — 40000083 ZZH STATISTIC IP LACTATION SERVICES 1-15 MIN

## 2020-01-20 PROCEDURE — 25000132 ZZH RX MED GY IP 250 OP 250 PS 637: Performed by: ADVANCED PRACTICE MIDWIFE

## 2020-01-20 RX ORDER — AMOXICILLIN 250 MG
2 CAPSULE ORAL 2 TIMES DAILY
Status: DISCONTINUED | OUTPATIENT
Start: 2020-01-20 | End: 2020-01-21 | Stop reason: HOSPADM

## 2020-01-20 RX ORDER — BISACODYL 10 MG
10 SUPPOSITORY, RECTAL RECTAL DAILY PRN
Status: DISCONTINUED | OUTPATIENT
Start: 2020-01-22 | End: 2020-01-21 | Stop reason: HOSPADM

## 2020-01-20 RX ORDER — NALOXONE HYDROCHLORIDE 0.4 MG/ML
.1-.4 INJECTION, SOLUTION INTRAMUSCULAR; INTRAVENOUS; SUBCUTANEOUS
Status: DISCONTINUED | OUTPATIENT
Start: 2020-01-20 | End: 2020-01-21 | Stop reason: HOSPADM

## 2020-01-20 RX ORDER — OXYTOCIN/0.9 % SODIUM CHLORIDE 30/500 ML
100 PLASTIC BAG, INJECTION (ML) INTRAVENOUS CONTINUOUS
Status: DISCONTINUED | OUTPATIENT
Start: 2020-01-20 | End: 2020-01-21 | Stop reason: HOSPADM

## 2020-01-20 RX ORDER — IBUPROFEN 800 MG/1
800 TABLET, FILM COATED ORAL EVERY 6 HOURS PRN
Status: DISCONTINUED | OUTPATIENT
Start: 2020-01-20 | End: 2020-01-21 | Stop reason: HOSPADM

## 2020-01-20 RX ORDER — LANOLIN 100 %
OINTMENT (GRAM) TOPICAL
Status: DISCONTINUED | OUTPATIENT
Start: 2020-01-20 | End: 2020-01-21 | Stop reason: HOSPADM

## 2020-01-20 RX ORDER — ACETAMINOPHEN 325 MG/1
650 TABLET ORAL EVERY 4 HOURS PRN
Status: DISCONTINUED | OUTPATIENT
Start: 2020-01-20 | End: 2020-01-21 | Stop reason: HOSPADM

## 2020-01-20 RX ORDER — AMOXICILLIN 250 MG
1 CAPSULE ORAL 2 TIMES DAILY
Status: DISCONTINUED | OUTPATIENT
Start: 2020-01-20 | End: 2020-01-21 | Stop reason: HOSPADM

## 2020-01-20 RX ORDER — HYDROCORTISONE 2.5 %
CREAM (GRAM) TOPICAL 3 TIMES DAILY PRN
Status: DISCONTINUED | OUTPATIENT
Start: 2020-01-20 | End: 2020-01-21 | Stop reason: HOSPADM

## 2020-01-20 RX ORDER — OXYTOCIN/0.9 % SODIUM CHLORIDE 30/500 ML
340 PLASTIC BAG, INJECTION (ML) INTRAVENOUS CONTINUOUS PRN
Status: DISCONTINUED | OUTPATIENT
Start: 2020-01-20 | End: 2020-01-21 | Stop reason: HOSPADM

## 2020-01-20 RX ORDER — OXYTOCIN 10 [USP'U]/ML
10 INJECTION, SOLUTION INTRAMUSCULAR; INTRAVENOUS
Status: DISCONTINUED | OUTPATIENT
Start: 2020-01-20 | End: 2020-01-21 | Stop reason: HOSPADM

## 2020-01-20 RX ADMIN — SENNOSIDES AND DOCUSATE SODIUM 1 TABLET: 8.6; 5 TABLET ORAL at 20:25

## 2020-01-20 RX ADMIN — ACETAMINOPHEN 650 MG: 325 TABLET, FILM COATED ORAL at 20:28

## 2020-01-20 RX ADMIN — ACETAMINOPHEN 650 MG: 325 TABLET, FILM COATED ORAL at 04:11

## 2020-01-20 RX ADMIN — IBUPROFEN 800 MG: 800 TABLET ORAL at 16:56

## 2020-01-20 RX ADMIN — IBUPROFEN 800 MG: 800 TABLET ORAL at 08:14

## 2020-01-20 NOTE — ANESTHESIA POSTPROCEDURE EVALUATION
Patient: Rivka Xie    * No procedures listed *    Diagnosis:* No pre-op diagnosis entered *  Diagnosis Additional Information: labor    Anesthesia Type:  Epidural    Note:  Anesthesia Post Evaluation         Comments:     S/P epidural for labor.   I or my partner was immediately available for management of this patient during epidural analgesia infusion.  VSS.  Doing well. Block resolved.  Neuro at baseline. Denies positional headache. Minimal side effects easily managed w/ PRN meds. No apparent anesthetic complications. No follow-up required.    Tj Daniel MD        Last vitals:  Vitals:    01/19/20 2116 01/20/20 0100 01/20/20 0411   BP: 108/73 124/76 114/80   Pulse:  92 73   Resp:  17 18   Temp:  98.1  F (36.7  C) 97.6  F (36.4  C)   SpO2:            Electronically Signed By: Tj Daniel MD  January 20, 2020  7:14 AM

## 2020-01-20 NOTE — PLAN OF CARE
Data: Rivka Xie transferred to room 430 via wheelchair at 2045. Baby transferred via parent's arms.  Action: Receiving unit notified of transfer: Yes. Patient and family notified of room change. Report given to Elzbieta URBAN at 2047. Belongings sent to receiving unit. Accompanied by Registered Nurse. Oriented patient to surroundings. Call light within reach. ID bands double-checked with receiving RN.  Response: Patient tolerated transfer and is stable.  Patients mobililty level scored using the bedside mobility assistance tool (BMAT). Patient is at a mobility level test number: 4. Mobility equipment used: none required. Required assist of 0 staff members. Further use of BMAT scoring not required.

## 2020-01-20 NOTE — PROGRESS NOTES
Ayanna Argueta CNM Progress Note: Postpartum Day #1    January 20, 2020  9:45 AM    SUBJECTIVE:  Patient is stable  and is tolerating acitivity well  Baby is rooming in  Complications since 2 hours post delivery: None  Pain is well controlled.  Patient is taking pain medications.  Breastfeeding status:initiated   Elimination:  She is voiding without difficulty.  She has not had a bowel movement  Desired contraception Unsure  Denies heavy bleeding and passing large clots.  Feels good about birth experience.    OBJECTIVE:  /75   Pulse 78   Temp 98.1  F (36.7  C) (Oral)   Resp 16   LMP 04/22/2019 (Exact Date)   SpO2 96%   Breastfeeding Unknown     Constitutional: healthy, alert and no distress    Breasts: Currently breastfeeding    Fundus: Uterine fundus is firm, non-tender and at -2 below the level of the umbilicus    Perineum: Perineum is intact and/or well approximated, minimal swelling    Lochia: Lochia is appropriate for the duration of time since delivery.     ASSESSMENT:  PPD #1  Vacuum assisted vaginal delivery  Doing well.  Hemoglobin   Date Value Ref Range Status   01/19/2020 11.2 (L) 11.7 - 15.7 g/dL Final   ]      PLAN:  Continue routine care  Ambulation encouraged  Reviewed breastfeeding  Reviewed postpartum warning signs  Reviewed postpartum blues and postpartum depression warning signs  Plans unsure for contraception postpartum  Anticipated discharge tomorrow.        Shannen Cast CNM

## 2020-01-20 NOTE — PLAN OF CARE
Patient is vitally stable. Ambulating and independent with cares. Pain is well managed with PRN Tylenol. Breastfeeding but infant has been sleepy on shift. Hand expression and spoon feedings were given. Parents are attentive to infant needs and bonding well with infant.

## 2020-01-20 NOTE — PLAN OF CARE
VSS, Bonding well with baby. Pain is well controlled with ibuprofen. Patient is voiding without difficulty and ambulating independently. Tolerating regular diet well. Independent in self and baby cares. Breastfeeding is going well.

## 2020-01-20 NOTE — PROGRESS NOTES
Courtesy visit  Dr. Tavarez performed VAVD yesterday     Doing well.   Has no complaints.     Questions and concerns addressed.   CNM service will continue ongoing postpartum cares.     Eduardo Schreiber MD  [facility]

## 2020-01-20 NOTE — L&D DELIVERY NOTE
Rivka Xie is a 34 year old-year-old  female,  1 para 0 with LMP 19 and EDC 19, who was admitted for PROM at 38 weeks gestation.    Her prenatal care was at the [facility] service. Prenatal course was complicated by AMA. Vaginal Group B Streptococcus culture was negative.  Patient experienced spontaneous rupture of membranes at 2230 on 20, yielding clear fluid.  This was confirmed with a +ROM+ on L&D  She progressed spontaneously.  Patient received an epidural injection for pain relief.  The patient achieved complete dilation at 1407. She went on to deliver a male infant at 1820 by VEVD.  See delivery note.  VEVD performed after 3.5+ hrs of pushing with maternal exhaustion.  Apgars were  8 at one minute and 9 at five minutes. The fetal oropharynx was bulb suctioned on the perineum.  There was no nuchal cord. The placenta delivered spontaneously and intact at 1824.  The patient had a midline second degree laceration. This was repaired with 3-0 vicryl by Nalini Cast CNM.  EBL for the delivery was 275cc.  Duration of the first stage of labor was 6hrs and 10 minutes, second stage 4 hrs and 13 minutes, and third stage 4 minutes.  The patient has elected to Breastfeed her infant.  Dr. Kristopher Tavarez

## 2020-01-20 NOTE — PLAN OF CARE
Data: Vital signs within normal limits. Postpartum checks within normal limits - see flow record. Patient eating and drinking normally. Patient initially straight cath'ed for urine following delivery, patient will attempt to void independently within the hour. No apparent signs of infection. Perineum  healing well. Patient performing self cares and is able to care for infant.  Action: Patient denies pain, and is coping well. Patient education done about infant safety, pain management, self care, breastfeeding strategies. See flow record.  Response: Positive attachment behaviors observed with infant. Support persons , Andrew, present.   Plan: Anticipate discharge on 1/21.

## 2020-01-20 NOTE — LACTATION NOTE
Lactation visit. Baby has been spitty last night and a little today. Assisted with laid back position at 45 degrees with baby on top to help awaken sleepy baby and help him root over to the nipple for latch. Prepared parents for poor feedings while baby is so spitty. Enc mom to use laid back position to help baby latch better while waiting for him to be ready. Lactation to follow up tomorrow.

## 2020-01-21 VITALS
RESPIRATION RATE: 16 BRPM | TEMPERATURE: 97.8 F | OXYGEN SATURATION: 96 % | HEART RATE: 87 BPM | DIASTOLIC BLOOD PRESSURE: 71 MMHG | SYSTOLIC BLOOD PRESSURE: 105 MMHG

## 2020-01-21 PROCEDURE — 25000132 ZZH RX MED GY IP 250 OP 250 PS 637: Performed by: OBSTETRICS & GYNECOLOGY

## 2020-01-21 RX ORDER — IBUPROFEN 600 MG/1
600 TABLET, FILM COATED ORAL EVERY 6 HOURS PRN
COMMUNITY
Start: 2020-01-21 | End: 2020-03-13

## 2020-01-21 RX ADMIN — SENNOSIDES AND DOCUSATE SODIUM 2 TABLET: 8.6; 5 TABLET ORAL at 09:53

## 2020-01-21 RX ADMIN — IBUPROFEN 800 MG: 800 TABLET ORAL at 09:53

## 2020-01-21 RX ADMIN — IBUPROFEN 800 MG: 800 TABLET ORAL at 03:01

## 2020-01-21 NOTE — PLAN OF CARE
Doing well with self and infant cares, mother states she feels baby latching better, nursing improving. Ibuprofen and tylenol for pain with stated relief, denies difficulty voiding. FOB present and supportive,.participating in baby cares.

## 2020-01-21 NOTE — DISCHARGE INSTRUCTIONS
Postpartum Vaginal Delivery Instructions  Return to clinic in 2 weeks for follow-up  Lactation [phone]  [facility] [phone]    Activity       Ask family and friends for help when you need it.    Do not place anything in your vagina for 6 weeks.    You are not restricted on other activities, but take it easy for a few weeks to allow your body to recover from delivery.  You are able to do any activities you feel up to that point.    No driving until you have stopped taking your pain medications (usually two weeks after delivery).     Call your health care provider if you have any of these symptoms:       Increased pain, swelling, redness, or fluid around your stiches from an episiotomy or perineal tear.    A fever above 100.4 F (38 C) with or without chills when placing a thermometer under your tongue.    You soak a sanitary pad with blood within 1 hour, or you see blood clots larger than a golf ball.    Bleeding that lasts more than 6 weeks.    Vaginal discharge that smells bad.    Severe pain, cramping or tenderness in your lower belly area.    A need to urinate more frequently (use the toilet more often), more urgently (use the toilet very quickly), or it burns when you urinate.    Nausea and vomiting.    Redness, swelling or pain around a vein in your leg.    Problems breastfeeding or a red or painful area on your breast.    Chest pain and cough or are gasping for air.    Problems coping with sadness, anxiety, or depression.  If you have any concerns about hurting yourself or the baby, call your provider immediately.     You have questions or concerns after you return home.     Keep your hands clean:  Always wash your hands before touching your perineal area and stitches.  This helps reduce your risk of infection.  If your hands aren't dirty, you may use an alcohol hand-rub to clean your hands. Keep your nails clean and short.

## 2020-01-21 NOTE — PLAN OF CARE
Patient is vitally stable. Ambulating and independent with cares. Pain is well managed with PRN Ibuprofen and Tylenol. Breastfeeding well with some assistance from staff with positioning and latching. Parents are attentive to infant needs and bonding well with infant.

## 2020-01-21 NOTE — LACTATION NOTE
This note was copied from a baby's chart.  LC visit and assist with latch on both sides.  She was struggling to position infant while in bed, so moved to the couch.  She did well latching infant on both sides.  She plans to discharge today so LC spent some time assisting with latch to increase her confidence.  She is aware that she can call prn following discharge if she should need additional lactation support.  No overall concerns present and all questions were answered.

## 2020-01-21 NOTE — DISCHARGE SUMMARY
Vaginal Delivery Postpartum Day 2    Patient Name:  Rivka Xie  :      1985  MRN:      [id number]      Subjective:  Rivka Xie is feeling well and ready for discharge home.  States she is breastfeeding well and voiding without difficulty.  States vaginal bleeding has decreased.  Denies passing any large clots.  Pain is well controlled with ibuprofen.  Denies any questions or concerns today.        Objective:  /71   Pulse 87   Temp 97.8  F (36.6  C) (Oral)   Resp 16   LMP 2019 (Exact Date)   SpO2 96%   Breastfeeding Unknown     Breasts: soft, lactating   Abdomen:soft, non-tender, fundus firm @ 2 below U  Perineum: healing, no edema, lochia small rubra   Extremities: +1 pedal edema bilaterally      Immunization History   Administered Date(s) Administered     Influenza Vaccine IM > 6 months Valent IIV4 11/15/2019     TDAP Vaccine (Adacel) 2009, 2016, 10/21/2019       Assessment:    -Stable PPD #2 s/p VAVD   -Normal involution  -Breastfeeding  -Rh positive      Plan:   -New mom information reviewed and all questions answered.  -Discharge home today  -Follow up in CNM clinic in 2 and 6 weeks  -Call with questions/concerns         Provider:  ALMA Mauricio, BEVERLY, EVELINE  Date:  2020  Time:  9:23 AM    Patient Name:  Rivka Xie  :  1985  MRN:  [id number]

## 2020-01-21 NOTE — PLAN OF CARE
Data: Vital signs within normal limits. Postpartum checks within normal limits - see flow record. Patient eating and drinking normally. Patient able to empty bladder independently and is up ambulating. No apparent signs of infection. Patient performing self cares and is able to care for infant.  Action: Patient medicated during the shift for pain and cramping. See MAR. Patient reassessed within 1 hour after each medication and pain was improved - patient stated she was comfortable. Patient education completed. See flow record.  Response: Positive attachment behaviors observed with infant. Father of infant present and attentive to both mother and infant needs.   Plan: Discharged to home today at 13:45 with all patient belongings. AVS read to patient. All questions and concerns addressed.

## 2020-01-29 ENCOUNTER — DOCUMENTATION ONLY (OUTPATIENT)
Dept: OBGYN | Facility: CLINIC | Age: 35
End: 2020-01-29

## 2020-01-29 NOTE — PROGRESS NOTES
[facility] will be sharing updates with you on Maternal Child Health Referral requests for home care services.  This is for care coordination purposes and alert you to referral status.  We received the referral for  Rivka Xie; MRN [id number] and want to update you:    [facility] has made 2 attempts to contact patient by phone and text message over the last 4 days.   We have not had any response from patient.  Final message was left advising patient to follow up with Primary Care Providers for mom and baby.    Sincerely [facility], [facility]  Robert Kelly  [phone]

## 2020-02-10 ENCOUNTER — PRENATAL OFFICE VISIT (OUTPATIENT)
Dept: OBGYN | Facility: CLINIC | Age: 35
End: 2020-02-10
Payer: COMMERCIAL

## 2020-02-10 VITALS — DIASTOLIC BLOOD PRESSURE: 74 MMHG | BODY MASS INDEX: 33.91 KG/M2 | SYSTOLIC BLOOD PRESSURE: 110 MMHG | WEIGHT: 233 LBS

## 2020-02-10 DIAGNOSIS — O09.523 MULTIGRAVIDA OF ADVANCED MATERNAL AGE IN THIRD TRIMESTER: ICD-10-CM

## 2020-02-10 PROCEDURE — 99207 ZZC POST PARTUM EXAM: CPT | Performed by: ADVANCED PRACTICE MIDWIFE

## 2020-02-10 NOTE — PROGRESS NOTES
Midwife Postpartum 2 Week Visit    Rivka Soto is a 34 year old here for a 2 week postpartum checkup.     Delivery date was 2020. She had a  and vaginal delivery with vacuum assist of a viable boy, weight 7 pounds 7.9 oz., with Failure to Progress complications      Since delivery, she has been breast feeding.  Has several questions and concerns relating to breastfeeding. She has not had any signs of infection. Her lochia is now scant.  She has not had other complications.      She is voiding and having bowel movements without difficulty.       Contraception was discussed and patient desires unsure.   She  has not had intercourse since delivery.   She complains of mild  perineal discomfort.     Mood is Stable  Patient screened for postpartum depression.   Depression Rating was:   Last PHQ-9 score on record = No flowsheet data found.  Last GAD7 score on record = No flowsheet data found.      ROS:  CONSTITUTIONAL: NEGATIVE for fever, chills, change in weight  RESP: NEGATIVE for significant cough or SOB  BREAST: lactating   GI: NEGATIVE for nausea, abdominal pain, heartburn, or change in bowel habits   female: no unusual urinary symptoms  PSYCHIATRIC: NEGATIVE for changes in mood or affect      Current Outpatient Medications:      ibuprofen (ADVIL/MOTRIN) 600 MG tablet, Take 1 tablet (600 mg) by mouth every 6 hours as needed for other (cramping), Disp: , Rfl:      Prenatal Vit-Fe Fumarate-FA (PRENATAL MULTIVITAMIN W/IRON) 27-0.8 MG tablet, Take 1 tablet by mouth daily, Disp: 90 tablet, Rfl: 3.   OB History    Para Term  AB Living   1 1 1 0 0 1   SAB TAB Ectopic Multiple Live Births   0 0 0 0 1      # Outcome Date GA Lbr Jaciel/2nd Weight Sex Delivery Anes PTL Lv   1 Term 20 38w6d 06:10 / 04:13 3.4 kg (7 lb 7.9 oz) M Vag-Spont EPI, Local N PROMISE      Complications: Failure to Progress in Second Stage      Name: ROSE SOTO-RIVKA      Apgar1: 8  Apgar5: 9     Last pap:    Lab Results    Component Value Date    PAP NIL 04/15/2019     Hgb in hospital was 11.2    EXAM:  LMP 04/22/2019 (Exact Date)   BMI: There is no height or weight on file to calculate BMI.  Exam:  Constitutional: healthy, alert and no distress  Respiratory: negative, Percussion normal. Good diaphragmatic excursion.   Psychiatric: mentation appears normal and affect normal/bright    ASSESSMENT:   Normal postpartum exam after vaginal delivery with vacuum assist.    ICD-10-CM    1. Routine postpartum follow-up Z39.2          PLAN:  No results found for any visits on 02/10/20.    Return for 6 week postpartum visit. return to clinic this week for Lactation visit to address questions.   Teaching: birth control  Family Planning:unsure, reviewed options patient has plans to conceive before 3 years does not want LARC    Encourage Kegels and abdominal exercise.  Continue a multivitamin/prenatal supplement, especially if breastfeeding.  Postpartum Hgb was not done today.      Renita Marrufo, DNP, APRN, CNM, IBCLC

## 2020-02-10 NOTE — NURSING NOTE
"Chief Complaint   Patient presents with     Postpartum Care     2 week postpartum check     Consult     Reports that she had to temporarly stop breast feeding because the baby was jaundice. Has resumed nursing. Patient concerned about latching and sore nipples. She also is concerned about whether the baby is getting enough to eat.       Initial /74 (BP Location: Left arm, Cuff Size: Adult Regular)   Wt 105.7 kg (233 lb)   LMP 2019 (Exact Date)   Breastfeeding Yes   BMI 33.91 kg/m   Estimated body mass index is 33.91 kg/m  as calculated from the following:    Height as of 19: 1.765 m (5' 9.5\").    Weight as of this encounter: 105.7 kg (233 lb).  BP completed using cuff size: regular    Questioned patient about current smoking habits.  Pt. has never smoked.          Nahun Ozuna CMA                        "

## 2020-02-13 ENCOUNTER — OFFICE VISIT (OUTPATIENT)
Dept: OBGYN | Facility: CLINIC | Age: 35
End: 2020-02-13
Payer: COMMERCIAL

## 2020-02-13 VITALS — WEIGHT: 232 LBS | DIASTOLIC BLOOD PRESSURE: 64 MMHG | SYSTOLIC BLOOD PRESSURE: 100 MMHG | BODY MASS INDEX: 33.77 KG/M2

## 2020-02-13 DIAGNOSIS — O92.70 LACTATION PROBLEM: Primary | ICD-10-CM

## 2020-02-13 PROCEDURE — 99207 ZZC POST PARTUM EXAM: CPT | Performed by: ADVANCED PRACTICE MIDWIFE

## 2020-02-13 NOTE — PROGRESS NOTES
SUBJECTIVE    Rivka Xie, a 34 year old female is here with baby for breastfeeding consultation and and weight check. Baby name: Steve, Baby age: 3.5 weeks    Mom presents to clinic with baby today with the chief complaint of fussy baby wondering . She reports she is directly feeding the baby Q 1-3 hrs for approximately 20-60 minutes and she is pumping her breasts about 1-2 times per day.  Patient is intermittently supplementing the direct breast feeds with 1-2 oz of formula.  She was consistently supplementing for 2 days while Steve had hyperbilirubinuria     Mom reports the following nipple complaints: none  She has not had breast engorgement problems.  She denies engorgement symptoms of her axilla.  She denies a history of endocrine problems such as hypothyroidism, diabetes, gestational diabetes.  She reports significant changes to breast size during pregnancy.     The baby was dealing with hyperbilirubinemia. Has not had growth problems, cleft palate or any other oropharynx problems, hypoglycemia.     ROS:  7-Point Review of Systems Negative-- Except as stated above.    OBJECTIVE  LMP 2019 (Exact Date)   A latch was observed today.    Latch:  2 - Good Latch  Audible Swallowin - Spontaneous & frequent  Type of Nipple:  2 - Everted  Comfort+: 2 - Soft, Nontender  Hold:  2 - No Assist  Suckin - Long, slow, continuous  TOTAL LATCHES SCORE:  12    Exam:  Constitutional: healthy, alert and no distress  Breasts: Breasts are symmetric, without breast or nipple rash, redness, warmth. Nipple exam: intact   Psych: Psychiatric: mentation appears normal and affect normal/bright    ASSESSMENT  (O92.70) Lactation problem  (primary encounter diagnosis)    Steve weighed 8 pounds 5oz at the beginning of the feeding   He took 0.8 oz on the left after 20 minutes   He took 1oz    on the right after 19 minutes  He took a total of 1.8 oz today.     Based on his weight today he needs 21.2 oz per day or about  1.8-1.9 oz per feeding if having 11-12 feedings per day.     In the morning, breast feed Steve and pump after. 1 hour after the feeding pump again. Do this for 1-2 feedings in a row in the early morning hours.   Pump of 20 minutes.   You can try pumping after every feeding during day time hours to increase supply. Do not get discouraged if you do not get milk.   Massage breasts during pumping.     Back is best for sleeping  Can add a pacifier if you would like, discontinue if Steve has latching issues     [email or web address] article   He is having adequate stools and wet diapers, this is a good sign that he is getting enough at the breast.   We reviewed maternal benefits to breastfeeding including decrease in postpartum depression, decreased postpartum bleeding, contraception.     Follow up with Provider - as scheduled      Renita Marrufo, TYLER, APRN, CNM, IBCLC

## 2020-02-13 NOTE — PATIENT INSTRUCTIONS
Steve weighed 8 pounds 5oz at the beginning of the feeding   He took 0.8 oz on the left after 20 minutes   He took 1oz    on the right after 19 minutes  He took a total of 1.8 oz today.     Based on his weight today he needs 21.2 oz per day or about 1.8-1.9 oz per feeding if having 11-12 feedings per day.     In the morning, breast feed Steve and pump after. 1 hour after the feeding pump again. Do this for 1-2 feedings in a row in the early morning hours.   Pump of 20 minutes.   You can try pumping after every feeding during day time hours to increase supply. Do not get discouraged if you do not get milk.   Massage breasts during pumping.     Back is best for sleeping  Can add a pacifier if you would like, discontinue if Steve has latching issues     [email or web address] article   He is having adequate stools and wet diapers, this is a good sign that he is getting enough at the breast.

## 2020-02-19 ENCOUNTER — TELEPHONE (OUTPATIENT)
Dept: OBGYN | Facility: CLINIC | Age: 35
End: 2020-02-19

## 2020-02-19 NOTE — TELEPHONE ENCOUNTER
Form received from: Patient    Form requesting following info/need: FMLA    CORBIN needed?: No    Location of form: Eva's desk    When completed the route for return: Fax [phone]

## 2020-03-13 ENCOUNTER — PRENATAL OFFICE VISIT (OUTPATIENT)
Dept: OBGYN | Facility: CLINIC | Age: 35
End: 2020-03-13
Payer: COMMERCIAL

## 2020-03-13 VITALS — BODY MASS INDEX: 33.19 KG/M2 | DIASTOLIC BLOOD PRESSURE: 72 MMHG | SYSTOLIC BLOOD PRESSURE: 108 MMHG | WEIGHT: 228 LBS

## 2020-03-13 DIAGNOSIS — Z12.4 SCREENING FOR CERVICAL CANCER: ICD-10-CM

## 2020-03-13 DIAGNOSIS — Z30.011 ENCOUNTER FOR BCP (BIRTH CONTROL PILLS) INITIAL PRESCRIPTION: ICD-10-CM

## 2020-03-13 PROBLEM — O09.523 MULTIGRAVIDA OF ADVANCED MATERNAL AGE IN THIRD TRIMESTER: Status: RESOLVED | Noted: 2020-01-19 | Resolved: 2020-01-19

## 2020-03-13 PROCEDURE — 87624 HPV HI-RISK TYP POOLED RSLT: CPT | Performed by: ADVANCED PRACTICE MIDWIFE

## 2020-03-13 PROCEDURE — G0145 SCR C/V CYTO,THINLAYER,RESCR: HCPCS | Performed by: ADVANCED PRACTICE MIDWIFE

## 2020-03-13 PROCEDURE — 99207 ZZC POST PARTUM EXAM: CPT | Performed by: ADVANCED PRACTICE MIDWIFE

## 2020-03-13 RX ORDER — ACETAMINOPHEN AND CODEINE PHOSPHATE 120; 12 MG/5ML; MG/5ML
0.35 SOLUTION ORAL DAILY
Qty: 84 TABLET | Refills: 3 | Status: SHIPPED | OUTPATIENT
Start: 2020-03-13 | End: 2020-09-09

## 2020-03-13 NOTE — NURSING NOTE
"Chief Complaint   Patient presents with     Postpartum Care     6 week PP check       Initial /72 (BP Location: Left arm, Cuff Size: Adult Regular)   Wt 103.4 kg (228 lb)   Breastfeeding Yes   BMI 33.19 kg/m   Estimated body mass index is 33.19 kg/m  as calculated from the following:    Height as of 19: 1.765 m (5' 9.5\").    Weight as of this encounter: 103.4 kg (228 lb).  BP completed using cuff size: regular    Questioned patient about current smoking habits.  Pt. has never smoked.          The following HM Due: pap smear      Nahun Ozuna, CMA    "

## 2020-03-13 NOTE — PROGRESS NOTES
Midwife Postpartum 6 Week Visit    Rivka Xie is a 34 year old here for a postpartum checkup.     Delivery date was 2020. She had a  and vaginal delivery with vacuum assist of a viable boy, weight 7 pounds 7.9 oz., with Failure to Progress complications      Since delivery, she has not been breast feeding.  She has not had any signs of infection, her lochia stopped after 3 weeks.  She has not had other complications.      She is voiding and having bowel movements without difficulty.       Contraception was discussed and patient desires oral contraceptives.   She  has not had intercourse since delivery.   She complains of No  perineal discomfort.     Mood is Stable  Patient screened for postpartum depression.   Depression Rating was:   Last PHQ-9 score on record = No flowsheet data found.  Last GAD7 score on record = No flowsheet data found.      ROS:  CONSTITUTIONAL: NEGATIVE for fever, chills, change in weight  EYES: NEGATIVE for vision changes or irritation  ENT: NEGATIVE for ear, mouth and throat problems  RESP: NEGATIVE for significant cough or SOB  BREAST: NEGATIVE for masses, tenderness or discharge  CV: NEGATIVE for chest pain, palpitations or peripheral edema  GI: NEGATIVE for nausea, abdominal pain, heartburn, or change in bowel habits   female: no unusual urinary symptoms and no unusual vaginal symptoms  NEURO: NEGATIVE for weakness, dizziness or paresthesias  PSYCHIATRIC: NEGATIVE for changes in mood or affect      Current Outpatient Medications:      ibuprofen (ADVIL/MOTRIN) 600 MG tablet, Take 1 tablet (600 mg) by mouth every 6 hours as needed for other (cramping), Disp: , Rfl:      Prenatal Vit-Fe Fumarate-FA (PRENATAL MULTIVITAMIN W/IRON) 27-0.8 MG tablet, Take 1 tablet by mouth daily, Disp: 90 tablet, Rfl: 3.   OB History    Para Term  AB Living   1 1 1 0 0 1   SAB TAB Ectopic Multiple Live Births   0 0 0 0 1      # Outcome Date GA Lbr Jaciel/2nd Weight Sex Delivery Anes  PTL Lv   1 Term 01/19/20 38w6d 06:10 / 04:13 3.4 kg (7 lb 7.9 oz) M Vag-Vacuum EPI, Local N PROMISE      Complications: Failure to Progress in Second Stage      Name: ROSE SOTO-PHILIPP      Apgar1: 8  Apgar5: 9     Last pap:    Lab Results   Component Value Date    PAP NIL 04/15/2019     Hgb in hospital was 11.2    EXAM:  LMP 04/22/2019 (Exact Date)   BMI: There is no height or weight on file to calculate BMI.  Exam:  Constitutional: healthy, alert and no distress  Head: Normocephalic. No masses, lesions, tenderness or abnormalities  Cardiovascular: negative  Respiratory: negative  Breasts: Deferred as patient is lactating  Gastrointestinal: Abdomen soft, non-tender. BS normal. No masses, organomegaly    Musculoskeletal: gait normal and normal muscle tone  Skin: no suspicious lesions or rashes  Neurologic: negative  Psychiatric: mentation appears normal and affect normal/bright  Hematologic/Lymphatic/Immunologic: Negative  PELVIC EXAM:  Vulva: No lesions, continued healing noted some reddened tissue at introitus edges well approximated, BUS WNL, no tenderness  Vagina: Moist, pink, discharge normal  well rugated, no lesions  Cervix:smooth, pink, no visible lesions  Uterus: Involuted to normal size, non-tender, no masses palpated  Ovaries: No masses palpated  Pelvic tone: WNL  Rectal exam: deferred      ASSESSMENT:   Normal postpartum exam after vaginal delivery with vacuum assist.  (Z39.2) Routine postpartum follow-up  (primary encounter diagnosis)    (Z12.4) Screening for cervical cancer  Comment: wnl  Plan: Pap imaged thin layer screen with HPV -         recommended age 30 - 65 years (select HPV order        below), HPV High Risk Types DNA Cervical            (Z30.011) Encounter for BCP (birth control pills) initial prescription  Comment: ordered   Plan: norethindrone (MICRONOR) 0.35 MG tablet        -Instructed on use       PLAN:  No results found for any visits on 03/13/20.    Return as needed or at time of next  expected pap, pelvic, or breast exam.  Teaching: exercise and birth control  Family Planning:mini pill / progesterone only pill  Encourage Kegels and abdominal exercise.  Continue a multivitamin/prenatal supplement, especially if breastfeeding.  Pap smear was obtained today.  Postpartum Hgb was not done today.      Renita Marrufo, DNP, APRN, CNM, IBCLC

## 2020-03-17 LAB
COPATH REPORT: NORMAL
PAP: NORMAL

## 2020-03-19 LAB
FINAL DIAGNOSIS: NORMAL
HPV HR 12 DNA CVX QL NAA+PROBE: NEGATIVE
HPV16 DNA SPEC QL NAA+PROBE: NEGATIVE
HPV18 DNA SPEC QL NAA+PROBE: NEGATIVE
SPECIMEN DESCRIPTION: NORMAL
SPECIMEN SOURCE CVX/VAG CYTO: NORMAL

## 2020-08-05 ENCOUNTER — E-VISIT (OUTPATIENT)
Dept: FAMILY MEDICINE | Facility: CLINIC | Age: 35
End: 2020-08-05
Payer: COMMERCIAL

## 2020-08-05 DIAGNOSIS — Z11.1 SCREENING EXAMINATION FOR PULMONARY TUBERCULOSIS: Primary | ICD-10-CM

## 2020-08-05 PROCEDURE — 99421 OL DIG E/M SVC 5-10 MIN: CPT | Performed by: NURSE PRACTITIONER

## 2020-08-10 DIAGNOSIS — Z11.1 SCREENING EXAMINATION FOR PULMONARY TUBERCULOSIS: ICD-10-CM

## 2020-08-10 PROCEDURE — 36415 COLL VENOUS BLD VENIPUNCTURE: CPT | Performed by: NURSE PRACTITIONER

## 2020-08-10 PROCEDURE — 86481 TB AG RESPONSE T-CELL SUSP: CPT | Performed by: NURSE PRACTITIONER

## 2020-08-12 LAB
GAMMA INTERFERON BACKGROUND BLD IA-ACNC: 0 IU/ML
M TB IFN-G CD4+ BCKGRND COR BLD-ACNC: 10 IU/ML
M TB TUBERC IFN-G BLD QL: NEGATIVE
MITOGEN IGNF BCKGRD COR BLD-ACNC: 0.02 IU/ML
MITOGEN IGNF BCKGRD COR BLD-ACNC: 0.02 IU/ML

## 2020-09-09 ENCOUNTER — MYC MEDICAL ADVICE (OUTPATIENT)
Dept: OBGYN | Facility: CLINIC | Age: 35
End: 2020-09-09

## 2020-09-09 DIAGNOSIS — B37.0 THRUSH: Primary | ICD-10-CM

## 2020-09-09 RX ORDER — MICONAZOLE NITRATE 20 MG/G
CREAM TOPICAL 2 TIMES DAILY
Qty: 30 G | Refills: 0 | Status: SHIPPED | OUTPATIENT
Start: 2020-09-09 | End: 2021-07-06

## 2020-09-09 NOTE — TELEPHONE ENCOUNTER
Topical antifungal prescribed. No clinic visit needed unless she develops symptoms herself.    Sarai Regalado, DNP, APRN, CNM

## 2021-01-03 ENCOUNTER — HEALTH MAINTENANCE LETTER (OUTPATIENT)
Age: 36
End: 2021-01-03

## 2021-04-25 ENCOUNTER — HEALTH MAINTENANCE LETTER (OUTPATIENT)
Age: 36
End: 2021-04-25

## 2021-06-07 ENCOUNTER — PRENATAL OFFICE VISIT (OUTPATIENT)
Dept: NURSING | Facility: CLINIC | Age: 36
End: 2021-06-07
Payer: COMMERCIAL

## 2021-06-07 DIAGNOSIS — O09.529 SUPERVISION OF HIGH-RISK PREGNANCY OF ELDERLY MULTIGRAVIDA: Primary | ICD-10-CM

## 2021-06-07 PROCEDURE — 99207 PR NO CHARGE NURSE ONLY: CPT

## 2021-06-07 NOTE — PROGRESS NOTES
NPN nurse visit done over the phone. Pt will be given NPN folder and book at her upcoming appt.   Discussed optional screening available to assess chromosomal anomalies. Questions answered. Pt advised to call the clinic if she has any questions or concerns related to her pregnancy. Prenatal labs will be obtained at her upcoming appt. New prenatal visit scheduled on 7/6/21 with Nalini Cast CNM.    6w4d    Lab Results   Component Value Date    PAP NIL 03/13/2020           Patient supplied answers from flow sheet for:  Prenatal OB Questionnaire.  Past Medical History  Have you ever recieved care for your mental health? : (P) No  Have you ever been in a major accident or suffered serious trauma?: (P) No  Within the last year, has anyone hit, slapped, kicked or otherwise hurt you?: (P) No  In the last year, has anyone forced you to have sex when you didn't want to?: (P) No    Past Medical History 2   Have you ever received a blood transfusion?: (P) No  Would you accept a blood transfusion if was medically recommended?: (P) Yes  Does anyone in your home smoke?: (P) No   Is your blood type Rh negative?: (P) No  Have you ever ?: (!) (P) Yes  Have you been hospitalized for a nonsurgical reason excluding normal delivery?: (P) No  Have you ever had an abnormal pap smear?: (!) (P) Yes    Past Medical History (Continued)  Do you have a history of abnormalities of the uterus?: (P) No  Did your mother take SHAHRAM or any other hormones when she was pregnant with you?: (P) Unknown  Do you have any other problems we have not asked about which you feel may be important to this pregnancy?: (P) No    Princess Serna RN

## 2021-06-22 ENCOUNTER — ANCILLARY PROCEDURE (OUTPATIENT)
Dept: ULTRASOUND IMAGING | Facility: CLINIC | Age: 36
End: 2021-06-22
Payer: COMMERCIAL

## 2021-06-22 DIAGNOSIS — O09.529 SUPERVISION OF HIGH-RISK PREGNANCY OF ELDERLY MULTIGRAVIDA: ICD-10-CM

## 2021-06-22 LAB
ABO + RH BLD: NORMAL
ABO + RH BLD: NORMAL
ALBUMIN UR-MCNC: NEGATIVE MG/DL
APPEARANCE UR: CLEAR
BACTERIA #/AREA URNS HPF: ABNORMAL /HPF
BILIRUB UR QL STRIP: NEGATIVE
BLD GP AB SCN SERPL QL: NORMAL
BLOOD BANK CMNT PATIENT-IMP: NORMAL
COLOR UR AUTO: YELLOW
ERYTHROCYTE [DISTWIDTH] IN BLOOD BY AUTOMATED COUNT: 13.5 % (ref 10–15)
GLUCOSE UR STRIP-MCNC: NEGATIVE MG/DL
HCT VFR BLD AUTO: 35.3 % (ref 35–47)
HGB BLD-MCNC: 11.5 G/DL (ref 11.7–15.7)
HGB UR QL STRIP: NEGATIVE
KETONES UR STRIP-MCNC: NEGATIVE MG/DL
LEUKOCYTE ESTERASE UR QL STRIP: ABNORMAL
MCH RBC QN AUTO: 28.8 PG (ref 26.5–33)
MCHC RBC AUTO-ENTMCNC: 32.6 G/DL (ref 31.5–36.5)
MCV RBC AUTO: 89 FL (ref 78–100)
NITRATE UR QL: NEGATIVE
NON-SQ EPI CELLS #/AREA URNS LPF: ABNORMAL /LPF
PH UR STRIP: 6.5 PH (ref 5–7)
PLATELET # BLD AUTO: 283 10E9/L (ref 150–450)
RBC # BLD AUTO: 3.99 10E12/L (ref 3.8–5.2)
RBC #/AREA URNS AUTO: ABNORMAL /HPF
SOURCE: ABNORMAL
SP GR UR STRIP: 1.01 (ref 1–1.03)
SPECIMEN EXP DATE BLD: NORMAL
UROBILINOGEN UR STRIP-ACNC: 0.2 EU/DL (ref 0.2–1)
WBC # BLD AUTO: 7.4 10E9/L (ref 4–11)
WBC #/AREA URNS AUTO: ABNORMAL /HPF

## 2021-06-22 PROCEDURE — 99000 SPECIMEN HANDLING OFFICE-LAB: CPT | Performed by: ADVANCED PRACTICE MIDWIFE

## 2021-06-22 PROCEDURE — 36415 COLL VENOUS BLD VENIPUNCTURE: CPT | Performed by: ADVANCED PRACTICE MIDWIFE

## 2021-06-22 PROCEDURE — 86780 TREPONEMA PALLIDUM: CPT | Mod: 90 | Performed by: ADVANCED PRACTICE MIDWIFE

## 2021-06-22 PROCEDURE — 76801 OB US < 14 WKS SINGLE FETUS: CPT | Performed by: OBSTETRICS & GYNECOLOGY

## 2021-06-22 PROCEDURE — 87086 URINE CULTURE/COLONY COUNT: CPT | Performed by: ADVANCED PRACTICE MIDWIFE

## 2021-06-22 PROCEDURE — 87340 HEPATITIS B SURFACE AG IA: CPT | Performed by: ADVANCED PRACTICE MIDWIFE

## 2021-06-22 PROCEDURE — 86850 RBC ANTIBODY SCREEN: CPT | Performed by: ADVANCED PRACTICE MIDWIFE

## 2021-06-22 PROCEDURE — 87389 HIV-1 AG W/HIV-1&-2 AB AG IA: CPT | Performed by: ADVANCED PRACTICE MIDWIFE

## 2021-06-22 PROCEDURE — 86762 RUBELLA ANTIBODY: CPT | Performed by: ADVANCED PRACTICE MIDWIFE

## 2021-06-22 PROCEDURE — 81001 URINALYSIS AUTO W/SCOPE: CPT | Performed by: ADVANCED PRACTICE MIDWIFE

## 2021-06-22 PROCEDURE — 86901 BLOOD TYPING SEROLOGIC RH(D): CPT | Performed by: ADVANCED PRACTICE MIDWIFE

## 2021-06-22 PROCEDURE — 86900 BLOOD TYPING SEROLOGIC ABO: CPT | Performed by: ADVANCED PRACTICE MIDWIFE

## 2021-06-22 PROCEDURE — 85027 COMPLETE CBC AUTOMATED: CPT | Performed by: ADVANCED PRACTICE MIDWIFE

## 2021-06-23 LAB
BACTERIA SPEC CULT: NORMAL
HBV SURFACE AG SERPL QL IA: NONREACTIVE
HIV 1+2 AB+HIV1 P24 AG SERPL QL IA: NONREACTIVE
Lab: NORMAL
RUBV IGG SERPL IA-ACNC: 34 IU/ML
SPECIMEN SOURCE: NORMAL
T PALLIDUM AB SER QL: NONREACTIVE

## 2021-07-06 ENCOUNTER — PRENATAL OFFICE VISIT (OUTPATIENT)
Dept: OBGYN | Facility: CLINIC | Age: 36
End: 2021-07-06
Payer: COMMERCIAL

## 2021-07-06 VITALS
BODY MASS INDEX: 36.06 KG/M2 | HEIGHT: 70 IN | WEIGHT: 251.9 LBS | SYSTOLIC BLOOD PRESSURE: 104 MMHG | DIASTOLIC BLOOD PRESSURE: 70 MMHG

## 2021-07-06 DIAGNOSIS — Z34.81 ENCOUNTER FOR SUPERVISION OF OTHER NORMAL PREGNANCY, FIRST TRIMESTER: Primary | ICD-10-CM

## 2021-07-06 PROCEDURE — 87591 N.GONORRHOEAE DNA AMP PROB: CPT | Performed by: ADVANCED PRACTICE MIDWIFE

## 2021-07-06 PROCEDURE — 36415 COLL VENOUS BLD VENIPUNCTURE: CPT | Performed by: ADVANCED PRACTICE MIDWIFE

## 2021-07-06 PROCEDURE — 87491 CHLMYD TRACH DNA AMP PROBE: CPT | Performed by: ADVANCED PRACTICE MIDWIFE

## 2021-07-06 PROCEDURE — 99207 PR FIRST OB VISIT: CPT | Performed by: ADVANCED PRACTICE MIDWIFE

## 2021-07-06 PROCEDURE — 86803 HEPATITIS C AB TEST: CPT | Performed by: ADVANCED PRACTICE MIDWIFE

## 2021-07-06 ASSESSMENT — MIFFLIN-ST. JEOR: SCORE: 1912.86

## 2021-07-06 NOTE — PROGRESS NOTES
Rivka Xie is a 35 year old   woman,  who is a previous CNM patient. She presents for a new OB Visit. This was a planned pregnancy.     FOB is Andrew who is in good health.  FOB IS actively involved in relationship and this pregnancy.     Rivka presents for her first OB visit. She is happy and excited to be pregnant, but just lost her mother this past weekend, and is dealing with stress/grief. Feels that she has adequate support at this time. Tearful at visit.     She has not had bleeding since her LMP.    She denies abdominal pain since her LMP.  She has had nausea.  has had vomiting. Now improving  Any personal or family history of blood clots? No  History of sickle cell anemia or trait? No         Patient's last menstrual period was 2021..  Estimated Date of Delivery: 2022 Ultrasound consistent with LMP.    MENSTRUAL HISTORY    frequency: every 28-30 days  Last PAP:  2020  History of abnormal Pap?  Yes:      Health maintenance updated:  yes        Current medications are:    Current Outpatient Medications:      Prenatal Vit-Fe Fumarate-FA (PRENATAL MULTIVITAMIN W/IRON) 27-0.8 MG tablet, Take 1 tablet by mouth daily, Disp: 90 tablet, Rfl: 3     What medications are you currently taking? PNV    INFECTION HISTORY  HIV: No  Hepatitis B: No  Hepatitis C: No  Tuberculosis: No   Genital Herpes self: no  Herpes partner:  no  Chlamydia:  no  Gonorrhea:  no  HPV: Yes  BV:  No  Syphilis:  No  Chicken Pox:  Yes - age 4      OB HISTORY  OB History    Para Term  AB Living   2 1 1 0 0 1   SAB TAB Ectopic Multiple Live Births   0 0 0 0 1      # Outcome Date GA Lbr Jaciel/2nd Weight Sex Delivery Anes PTL Lv   2 Current            1 Term 20 38w6d 06:10 / 04:13 3.4 kg (7 lb 7.9 oz) M Vag-Vacuum EPI, Local N PROMISE      Complications: Failure to Progress in Second Stage      Name: CHARLESMALE-RIVKA      Apgar1: 8  Apgar5: 9       History of GDM: No,  PTL :  No,  History of HTN in pregnancy: No,  Thrombocytopenia: No,  Shoulder dystocia: No,  Vacuum Extraction: Yes   PPH: No   3rd of 4th degree laceration: No.   Other complications: No          PERSONAL HISTORY  Exercise Habits:  walking and weight training 4-7 days per week.  Employment: Full time.  Her job involves moderate activity with no potential for toxic exposure.    Travel plans:  are none planned.   Diet: follows a balanced nutrition diet  Prenatal vitamins? Yes:   Fish Oil? Yes:   Abuse concerns? No  Any history if abuse, varbal, physical, sexual? No  Hgb A1c screen:  BMI > 30: Yes, 1st degree family DM: No, History of GDM: No, PCOS: No, High risk ethnicity: No    Social History     Socioeconomic History     Marital status:      Spouse name: Andrew     Number of children: Not on file     Years of education: Not on file     Highest education level: Master's degree (e.g., MA, MS, Henrry, MEd, MSW, JOANN)   Occupational History     Occupation: RN     Employer: [employer]     Occupation: teacher     Employer: [employer]   Social Needs     Financial resource strain: Not hard at all     Food insecurity     Worry: Never true     Inability: Never true     Transportation needs     Medical: No     Non-medical: No   Tobacco Use     Smoking status: Never Smoker     Smokeless tobacco: Never Used   Substance and Sexual Activity     Alcohol use: Not Currently     Drug use: Never     Sexual activity: Yes     Partners: Male     Comment: Pregnant   Lifestyle     Physical activity     Days per week: Not on file     Minutes per session: Not on file     Stress: Not on file   Relationships     Social connections     Talks on phone: Not on file     Gets together: Not on file     Attends Yazidism service: Not on file     Active member of club or organization: Not on file     Attends meetings of clubs or organizations: Not on file     Relationship status: Not on file     Intimate partner violence     Fear of current or ex partner:  "Not on file     Emotionally abused: Not on file     Physically abused: Not on file     Forced sexual activity: Not on file   Other Topics Concern     Not on file   Social History Narrative     Not on file         She  reports that she has never smoked. She has never used smokeless tobacco.    STD testing offered?  Accepted  Last PHQ-9 score on record = No flowsheet data found.  Last GAD7 score on record = No flowsheet data found.  Alcohol Score = 0  Referral/Meds needed? no    PAST MEDICAL/SURGICAL HISTORY  Past Medical History:   Diagnosis Date     Cervical high risk HPV (human papillomavirus) test positive 04/15/2019    See problem list     Past Surgical History:   Procedure Laterality Date     FRACTURE TX, WRIST RT/LT       TONSILLECTOMY & ADENOIDECTOMY         FAMILY HISTORY  Family History   Adopted: Yes   Problem Relation Age of Onset     Diabetes Other         was told H/O DM  birth mothers grandparents         ROS:  CONSTITUTIONAL: NEGATIVE for fever, chills, change in weight  INTEGUMENTARU/SKIN: NEGATIVE for worrisome rashes, moles or lesions  EYES: NEGATIVE for vision changes or irritation  ENT: NEGATIVE for ear, mouth and throat problems  RESP: NEGATIVE for significant cough or SOB  BREAST: NEGATIVE for masses, tenderness or discharge  CV: NEGATIVE for chest pain, palpitations or peripheral edema  GI: NEGATIVE for nausea, abdominal pain, heartburn, or change in bowel habits  : NEGATIVE for unusual urinary or vaginal symptoms. Periods are regular.  MUSCULOSKELETAL: NEGATIVE for significant arthralgias or myalgia  NEURO: NEGATIVE for weakness, dizziness or paresthesias  ENDOCRINE: NEGATIVE for temperature intolerance, skin/hair changes  HEME/ALLERGY/IMMUNE: NEGATIVE for bleeding problems  PSYCHIATRIC: NEGATIVE for changes in mood or affect    PHYSICAL EXAM  Vitals: /70 (BP Location: Left arm, Cuff Size: Adult Regular)   Ht 1.778 m (5' 10\")   Wt 114.3 kg (251 lb 14.4 oz)   LMP 04/22/2021   BMI " 36.14 kg/m    BMI= Body mass index is 36.14 kg/m .     GENERAL:  35 year old pleasant pregnant female, alert, cooperative and well groomed.  NECK:  Thyroid without enlargement and nodules.  Lymph nodes not palpable.   LUNGS:  Clear to auscultation.  BREAST:  Deferred as patient just recently stopped breast feeding.  HEART:  RRR without murmur.  ABDOMEN: Soft without masses or tenderness.  No scars noted..  GENITALIA: BUS without tenderness or inflammation.  Perineum without lesions.    VAGINA:  Pink, normal rugae and discharge.  CERVIX:  Mid, smooth, without discharge, and firm/ closed 4 cm long.   UTERUS: Anteverted, nontender 10 weeks in size.  ADNEXA: Without masses or tenderness  RECTAL:  Normal appearance.  Digital exam deferred.  LOWER EXTREMITIES: No edema. No significant varicosities.    Pelvic exam limited by maternal habitus    ASSESSMENT/PLAN:    IUP at 10w5d        ICD-10-CM    1. Encounter for supervision of other normal pregnancy, first trimester  Z34.81 NEISSERIA GONORRHOEA PCR     CHLAMYDIA TRACHOMATIS PCR     NIPT to [facility]: Laboratory Miscellaneous Order     Hepatitis C Screen Reflex to HCV RNA Quant and Genotype     Send outs misc test        consult for US for AMA patients: Yes plan Level II with M  Genetic Testing reviewed and discussed, patient desires NIPT. Handout provided      COUNSELING    Instructed on use of triage nurse line and contacting the on call CNM after hours in an emergency.     Symptoms of N&V and fatigue usually start to resolve around 12-16 weeks     Reviewed CNM philosophy, call schedule for labor and delivery, and FR for delivery    1st OB handout given outlining appointment spacing and CNM information    Reviewed exercise and nutrition    Recommend to gain 15 pounds with her pregnancy.    Discussed OTC medications. OB med list given    Encouraged patient to arrange  if needed    Encouraged patient to take PNV's/DHA    Travel precautions discussed, no  air travel after 36 weeks and Zika Virus discussed    Will call patient with lab results when available    Does patient desire a RN home visit from the [facility]?  No    If yes, paperwork completed?  No     F/U to be addressed next visit:  NIPT results, check mood and assess for depression    Will return to the clinic in 2 weeks for her next routine prenatal check.  Will call to be seen sooner if problems arise.    ALMA Roberson, DEBORAHM

## 2021-07-06 NOTE — PATIENT INSTRUCTIONS
Thank you for coming to see the Midwives at the   [facility]      We will notify you about your labs that were drawn today once we get the results back or if you have [employer] they will be posted there as well      We will call you personally with results that require further discussion      If any referrals were ordered today you should be getting a call in the next week or you may need to call the number listed with your referral to schedule.            If you need any refills of medications please call your pharmacy and they will contact us      If you have any questions or concerns before your next visit, you can reach the nurse midwife on call by calling the clinic number at [phone].      If you  wish to schedule another appointment, please call our office at [phone]. You can also make appointments through [employer]        If you have a medical emergency please call [phone].    Because you are pregnant, we have additional resources for you:      You may call our consulting RN's during normal business hours for non-urgent questions about your pregnancy.      After hours you may also page the midwife on call for urgent questions or issues by calling the triage line at [phone].  There is always a midwife on call 24 hours a day.    Prenatal Reminders:    Before 14 weeks: Dating ultrasound, Genetic testing       This ultrasound helps us determine your dates accurately. Verifi can be drawn anytime after 10 weeks of gestation  16 weeks: Optional genetic testing (quad screen) or single AFP       This testing helps understand your baby's risk for some genetic abnormalities.  20 weeks:  Screening ultrasound (fetal survey)       This testing will look for early growth abnormalities, and may tell the baby's sex if you wish to find out.  28 weeks: One hour sugar test (GCT), hemoglobin and platelets       This test helps identify diabetes of pregnancy or gestational diabetes.  We also look      at the iron in  your blood and how well your blood clots.  28 - 36 weeks: Tetanus shot (Tdap)       This shot helps protect you and your baby from tetanus and whooping cough.  36 weeks and later: Group B Strep test (GBS)       This test helps predict if you need antibiotics in labor to prevent infection in your baby.  Anytime September to April:  Flu shot       This shot helps protect you and your family from the flu.  This is especially important during pregnancy        Any time during or after your pregnancy you may experience increased depression and/or mood changes.    We are here to support you. Please contact us if you are:    Feeling anxious    Overwhelmed or sad     Trouble sleeping    Crying uncontrollably    Trouble caring for yourself or baby.    The typical schedule after your first visit today you can expect:     Visit 2 - 12-16 weeks  Visit 3 - 20 weeks  Visit 4 - 24 weeks  Visit 5 - 28 weeks  Visit 6 - 32 weeks  Visit 7 - 34 weeks  Visit 8 - 36 weeks  Weekly after 36 weeks until delivery.    If anything comes up between your visits or you have concerns please don't hesitate to contact us.    Secure access to your medical record:  Use [email or web address] (secure email communication and access to your chart) to send your primary care provider a message or make an appointment. Ask someone on your Team how to sign up for [email or web address]. To log on to [employer] or for more information in [employer]33 please visit the website at [email or web address].       Certified Nurse Midwife (CNM) Team  ALMA Roberson, BEVERLY Cao, ALMA, ALMA Mane, BEVERLY Kelley, ALMA, BEVERLY Colejoscott, APRN, BEVERLY Sotomayor, APRN, CNM    Again, thank you for choosing the midwives at [facility].  We are excited to be a part of your pregnancy. Please let us know how we can best partner with you to improve your and your family's health.

## 2021-07-06 NOTE — NURSING NOTE
"Chief Complaint   Patient presents with     Prenatal Care     New OB       Initial /70 (BP Location: Left arm, Cuff Size: Adult Regular)   Ht 1.778 m (5' 10\")   Wt 114.3 kg (251 lb 14.4 oz)   LMP 2021   BMI 36.14 kg/m   Estimated body mass index is 36.14 kg/m  as calculated from the following:    Height as of this encounter: 1.778 m (5' 10\").    Weight as of this encounter: 114.3 kg (251 lb 14.4 oz).  BP completed using cuff size: regular    Questioned patient about current smoking habits.  Pt. has never smoked.          The following HM Due: NONE           "

## 2021-07-07 LAB
C TRACH DNA SPEC QL NAA+PROBE: NEGATIVE
HCV AB SERPL QL IA: NONREACTIVE
MISCELLANEOUS TEST: NORMAL
N GONORRHOEA DNA SPEC QL NAA+PROBE: NEGATIVE
SPECIMEN SOURCE: NORMAL
SPECIMEN SOURCE: NORMAL

## 2021-07-13 LAB — LAB SCANNED RESULT: NORMAL

## 2021-07-21 ENCOUNTER — TELEPHONE (OUTPATIENT)
Dept: OBGYN | Facility: CLINIC | Age: 36
End: 2021-07-21

## 2021-07-21 NOTE — TELEPHONE ENCOUNTER
Patient calling for [facility] results.  Informed of NEG results.  Please put Gender (MALE) results in envelope at  in [place] for pt to  today.    Christiana Dsouza RN

## 2021-07-27 ENCOUNTER — TRANSCRIBE ORDERS (OUTPATIENT)
Dept: MATERNAL FETAL MEDICINE | Facility: CLINIC | Age: 36
End: 2021-07-27

## 2021-07-27 ENCOUNTER — PRENATAL OFFICE VISIT (OUTPATIENT)
Dept: OBGYN | Facility: CLINIC | Age: 36
End: 2021-07-27
Payer: COMMERCIAL

## 2021-07-27 VITALS — BODY MASS INDEX: 36.04 KG/M2 | WEIGHT: 251.2 LBS | SYSTOLIC BLOOD PRESSURE: 118 MMHG | DIASTOLIC BLOOD PRESSURE: 84 MMHG

## 2021-07-27 DIAGNOSIS — O09.529 SUPERVISION OF HIGH-RISK PREGNANCY OF ELDERLY MULTIGRAVIDA: Primary | ICD-10-CM

## 2021-07-27 DIAGNOSIS — O26.90 PREGNANCY RELATED CONDITION, ANTEPARTUM: Primary | ICD-10-CM

## 2021-07-27 PROCEDURE — 99207 PR PRENATAL VISIT: CPT | Performed by: ADVANCED PRACTICE MIDWIFE

## 2021-07-27 NOTE — PROGRESS NOTES
S:Feels better. No concerns  Denies loss of fluid/vb/contractions/pelvic pain  Depression screening done  O:  /84 (BP Location: Left arm, Cuff Size: Adult Regular)   Wt 113.9 kg (251 lb 3.2 oz)   LMP 04/22/2021   Breastfeeding No   BMI 36.04 kg/m    Exam:  Constitutional: healthy, alert and no distress  Respiratory: Respirations even and unlabored  Gastrointestinal: Abdomen soft, non-tender. Fundus measures appropriately for gestational age. Fetal heart tones heard easily.  Psychiatric: mentation appears normal and affect normal/bright  A:    Diagnosis Comments   1. Supervision of high-risk pregnancy of elderly multigravida  [facility] Referral - Pregnancy        P: Referred to [facility] for anatomy scan due to AMA and BMI Discussed options for quad screen today  Patient will consider and let us know if she wishes to have quad screen done  Anatomy ultrasound next visit between 20-22 weeks  Return to clinic 4 weeks    ALMA Roberson, BEVERLY

## 2021-07-27 NOTE — NURSING NOTE
"Chief Complaint   Patient presents with     Prenatal Care       Initial /84 (BP Location: Left arm, Cuff Size: Adult Regular)   Wt 113.9 kg (251 lb 3.2 oz)   LMP 2021   Breastfeeding No   BMI 36.04 kg/m   Estimated body mass index is 36.04 kg/m  as calculated from the following:    Height as of 21: 1.778 m (5' 10\").    Weight as of this encounter: 113.9 kg (251 lb 3.2 oz).  BP completed using cuff size: regular    Questioned patient about current smoking habits.  Pt. has never smoked.                 "

## 2021-07-30 ENCOUNTER — MYC MEDICAL ADVICE (OUTPATIENT)
Dept: OBGYN | Facility: CLINIC | Age: 36
End: 2021-07-30

## 2021-07-30 NOTE — LETTER
[place] [address], [address]  [place], [place]  [address]                                            TEL:[phone]  FAX:[phone]      Rivka Xie  [address] [address] [address]    Employee ID [id number]        September 15, 2021      To Whom it May Concern,      Rivka is a prenatal patient at this clinic. Due to increased risk of joint and back injury, I have recommended that she limit her lifting to no more than 20 pounds for the duration of her pregnancy. These restrictions should start effective 07/15/2021 until 6 weeks postpartum. Her estimated date of delivery is 01/27/2021.      Please feel free to call me if you have any questions or concerns.              Sincerely,      ALMA Roberson, BEVERLY

## 2021-08-25 ENCOUNTER — PRENATAL OFFICE VISIT (OUTPATIENT)
Dept: OBGYN | Facility: CLINIC | Age: 36
End: 2021-08-25
Payer: COMMERCIAL

## 2021-08-25 VITALS — SYSTOLIC BLOOD PRESSURE: 114 MMHG | DIASTOLIC BLOOD PRESSURE: 68 MMHG | BODY MASS INDEX: 36.59 KG/M2 | WEIGHT: 255 LBS

## 2021-08-25 DIAGNOSIS — O09.92 HIGH-RISK PREGNANCY, SECOND TRIMESTER: ICD-10-CM

## 2021-08-25 DIAGNOSIS — O09.522 MULTIGRAVIDA OF ADVANCED MATERNAL AGE IN SECOND TRIMESTER: ICD-10-CM

## 2021-08-25 DIAGNOSIS — Z91.89 AT RISK FOR COMPLICATION OF PREGNANCY: ICD-10-CM

## 2021-08-25 PROCEDURE — 99207 PR PRENATAL VISIT: CPT | Performed by: ADVANCED PRACTICE MIDWIFE

## 2021-08-31 PROBLEM — O09.92 HIGH-RISK PREGNANCY, SECOND TRIMESTER: Status: ACTIVE | Noted: 2021-08-31

## 2021-08-31 PROBLEM — O42.90 PREMATURE RUPTURE OF MEMBRANES: Status: RESOLVED | Noted: 2020-01-19 | Resolved: 2021-08-31

## 2021-08-31 PROBLEM — O09.522 MULTIGRAVIDA OF ADVANCED MATERNAL AGE IN SECOND TRIMESTER: Status: ACTIVE | Noted: 2021-08-31

## 2021-08-31 PROBLEM — Z91.89 AT RISK FOR COMPLICATION OF PREGNANCY: Status: ACTIVE | Noted: 2021-08-31

## 2021-08-31 NOTE — PROGRESS NOTES
Rivka Xie presents to clinic alone at 17w6d for a routine prenatal appointment. She reports she is feeling well and has no concerns. Starting to feel flutters. Denies bleeding / LOF / cramping. Normal fetal movement.     High-stress social situation: still processing the death of her mother, Muriel, just a couple of months ago. Has a talk therapist through [facility], good support from family and friends. Muriel's birthday on Friday-- planning a celebration of her life!    At risk for PEC: reviewed rationale for baby aspirin (AMA, BMI 36)-- will start now.    AMA: plan for anatomy ultrasound with MFM.    Reviewed total weight gain of 0 kg (0 lb). Below range for expected total weight gain of 5 kg (11 lb)-9 kg (19 lb) with 4 lb interval gain. Continue to monitor.    COVID-19 Mitigation: vaccinated  Pregnancy checklist updated. Warning signs reviewed. RTC in 4 weeks, sooner if problems.

## 2021-09-01 ENCOUNTER — PRE VISIT (OUTPATIENT)
Dept: MATERNAL FETAL MEDICINE | Facility: CLINIC | Age: 36
End: 2021-09-01

## 2021-09-08 ENCOUNTER — OFFICE VISIT (OUTPATIENT)
Dept: MATERNAL FETAL MEDICINE | Facility: CLINIC | Age: 36
End: 2021-09-08
Attending: ADVANCED PRACTICE MIDWIFE
Payer: COMMERCIAL

## 2021-09-08 ENCOUNTER — HOSPITAL ENCOUNTER (OUTPATIENT)
Dept: ULTRASOUND IMAGING | Facility: CLINIC | Age: 36
End: 2021-09-08
Attending: ADVANCED PRACTICE MIDWIFE
Payer: COMMERCIAL

## 2021-09-08 DIAGNOSIS — O09.522 MULTIGRAVIDA OF ADVANCED MATERNAL AGE IN SECOND TRIMESTER: Primary | ICD-10-CM

## 2021-09-08 DIAGNOSIS — O26.90 PREGNANCY RELATED CONDITION, ANTEPARTUM: ICD-10-CM

## 2021-09-08 PROCEDURE — 76811 OB US DETAILED SNGL FETUS: CPT

## 2021-09-08 PROCEDURE — 76811 OB US DETAILED SNGL FETUS: CPT | Mod: 26 | Performed by: OBSTETRICS & GYNECOLOGY

## 2021-09-08 NOTE — PROGRESS NOTES
"Please see \"Imaging\" tab under \"Chart Review\" for details of today's US at the [facility].    Bertrand Massey MD  Maternal-Fetal Medicine    "

## 2021-09-15 ENCOUNTER — MYC MEDICAL ADVICE (OUTPATIENT)
Dept: OBGYN | Facility: CLINIC | Age: 36
End: 2021-09-15

## 2021-09-22 ENCOUNTER — PRENATAL OFFICE VISIT (OUTPATIENT)
Dept: OBGYN | Facility: CLINIC | Age: 36
End: 2021-09-22
Payer: COMMERCIAL

## 2021-09-22 VITALS — BODY MASS INDEX: 36.3 KG/M2 | WEIGHT: 253 LBS | DIASTOLIC BLOOD PRESSURE: 74 MMHG | SYSTOLIC BLOOD PRESSURE: 118 MMHG

## 2021-09-22 DIAGNOSIS — O09.92 HIGH-RISK PREGNANCY, SECOND TRIMESTER: Primary | ICD-10-CM

## 2021-09-22 DIAGNOSIS — R09.81 SINUS CONGESTION: ICD-10-CM

## 2021-09-22 PROCEDURE — 99207 PR PRENATAL VISIT: CPT | Performed by: ADVANCED PRACTICE MIDWIFE

## 2021-09-22 RX ORDER — ASPIRIN 81 MG/1
81 TABLET ORAL DAILY
COMMUNITY
End: 2022-01-20

## 2021-09-22 NOTE — PATIENT INSTRUCTIONS
Over-the-counter (OTC) medications during pregnancy    Make sure to follow package directions for dosing and information unless otherwise noted on the list.    Morning sickness/nausea:      Unisom (doxylamine) 12.5 mg (1/2 tab) and Vitamin B6 25-50 mg three times daily. Unisom may cause some drowsiness as it is typically used for sleep. The combination of these medications can be very effective but they can also be taken separately    Dramamine (Dimenhydrinate) 25-50 mg every 4-6 hours as needed    Benadryl (diphenhydramine) 25-50 mg every 4-6 hours     Ginger tablets 1000 mg per day in divided doses    Constipation:      Colace (Docusate sodium)    Metamucil    Citrucel    Milk of magnesia    Fibercon    Miralax (if all other methods have failed)    Diarrhea:    Imodium (loperamide)    Heartburn:      Zantac (ranitidine)    Pepcid (famotidine)    Prilosec (omeprazole)    Antacids-Tums, Maalox (liquid or tablets), Rolaids  Pepto Bismol and [place] are NOT RECOMMENDED for use during pregnancy because they contain aspirin    Hemorrhoids:      Tucks pads/ointment    Anusol/Anusol-HC    Preparation H    Gas Pain  Simethicone (Gas-X, Mylanta Gas, Mylicon)      Cough, cold, and congestion:      Robitussin (dextromethorphan) and Robitussin DM (dextromethorphan and guaifenesin)    Cough drops/zinc lozenges    Mucinex    Sudafed (after 16 weeks gestation)    Vicks Vapo rub  Cough medicine with alcohol like Nyquil is not recommended during pregnancy    Headache:      Acetaminophen (Tylenol) 650 mg every 4-6 hours or 1000 mg every 6 hours, do not exceed 4000 mg in 24 hours   Ibuprofen (Advil/Motrin) and Naproxen (Aleve) are not recommended during the 1st or 3rd trimester of pregnancy    Allergy:      Benadryl (diphenhydramine)    Zyrtec (cetirizine)    Claritin (loratadine)    Rash/Itching:      Benadryl lotion    Cortaid cream (Hydrocortisone cream)    Benadryl (diphenhydramine)    Vaginal yeast infection:      Monistat  3 or 7 day    Gyne-Lotrimin    Acne:      Benzoyl Peroxide    Salicylic acid     If you have questions or concerns about any medications that are available OTC or you are unsure if something is safe call:    Ayanna Argueta  [phone]

## 2021-09-22 NOTE — PROGRESS NOTES
S: Has a lingering cold,  Has started feeling fetal movement.  Denies uterine cramping, vaginal bleeding or leaking of fluid    Congestion x3 wks. Thinks her son had RSV and she got it from him. Is not running a fever and had negative COVID test.     Sudafed twice but got a pretty bad headache. Switched to Tylenol. Has humidifier     O: Vitals: /74 (BP Location: Left arm, Cuff Size: Adult Regular)   Wt 114.8 kg (253 lb)   LMP 04/22/2021   BMI 36.30 kg/m    BMI= Body mass index is 36.3 kg/m .  Exam:  Constitutional: healthy, alert and no distress  Respiratory: respirations even and unlabored  Gastrointestinal: Abdomen soft, non-tender. Fundus measures appropriate for gestational age. Fetal heart tones hear without difficulty and within normal limits  : Deferred  Psychiatric: mentation appears normal and affect normal/bright  A/P  1. (O09.92) High-risk pregnancy, second trimester  (primary encounter diagnosis)    2. (R09.81) Sinus congestion  - OB med list given, recommended trying Mucinex, increased hydration and rest  - Follow up with primary care if symptoms persist or worsen        Discussed 20 week fetal screen - done with MFM, wnl  Encouraged patient to call with any questions or concerns.  Follow up in 5 wks: Desires glucose and flu vaccine at next visit    ALMA Brandt, BEVERLY

## 2021-09-22 NOTE — NURSING NOTE
"Chief Complaint   Patient presents with     Prenatal Care     21w 6d, has had sinus pressure, congestion, cough since around        Initial /74 (BP Location: Left arm, Cuff Size: Adult Regular)   Wt 114.8 kg (253 lb)   LMP 2021   BMI 36.30 kg/m   Estimated body mass index is 36.3 kg/m  as calculated from the following:    Height as of 21: 1.778 m (5' 10\").    Weight as of this encounter: 114.8 kg (253 lb).  BP completed using cuff size: regular long    Questioned patient about current smoking habits.  Pt. has never smoked.          The following HM Due: NONE    "

## 2021-10-10 ENCOUNTER — HEALTH MAINTENANCE LETTER (OUTPATIENT)
Age: 36
End: 2021-10-10

## 2021-10-20 ENCOUNTER — PRENATAL OFFICE VISIT (OUTPATIENT)
Dept: OBGYN | Facility: CLINIC | Age: 36
End: 2021-10-20
Payer: COMMERCIAL

## 2021-10-20 VITALS — DIASTOLIC BLOOD PRESSURE: 72 MMHG | WEIGHT: 257 LBS | SYSTOLIC BLOOD PRESSURE: 122 MMHG | BODY MASS INDEX: 36.88 KG/M2

## 2021-10-20 DIAGNOSIS — O09.92 HIGH-RISK PREGNANCY, SECOND TRIMESTER: ICD-10-CM

## 2021-10-20 DIAGNOSIS — Z23 NEED FOR PROPHYLACTIC VACCINATION AND INOCULATION AGAINST INFLUENZA: Primary | ICD-10-CM

## 2021-10-20 PROCEDURE — 90686 IIV4 VACC NO PRSV 0.5 ML IM: CPT | Performed by: ADVANCED PRACTICE MIDWIFE

## 2021-10-20 PROCEDURE — 99207 PR PRENATAL VISIT: CPT | Performed by: ADVANCED PRACTICE MIDWIFE

## 2021-10-20 PROCEDURE — 90471 IMMUNIZATION ADMIN: CPT | Performed by: ADVANCED PRACTICE MIDWIFE

## 2021-10-20 NOTE — PROGRESS NOTES
Feeling well.  Baby is active. Denies any leaking of fluid, vaginal bleeding, regular uterine contractions, or headaches or other concerns.  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  She will return in 2 weeks for appt and lab.  Scheduled by MA.    Flu shot offered and accepted today.    Reviewed to call for contractions, loss of fluid, vaginal bleeding, decreased fetal movement or any other questions or concerns.    RTC in 2 weeks.  Jessica Almeida, TYLER, APRN, CNM

## 2021-10-20 NOTE — NURSING NOTE
"Chief Complaint   Patient presents with     Prenatal Care     25 weeks 6 days- no concerns        Initial /72 (BP Location: Right arm, Patient Position: Sitting, Cuff Size: Adult Large)   Wt 116.6 kg (257 lb)   LMP 2021   BMI 36.88 kg/m   Estimated body mass index is 36.88 kg/m  as calculated from the following:    Height as of 21: 1.778 m (5' 10\").    Weight as of this encounter: 116.6 kg (257 lb).  BP completed using cuff size: large    Questioned patient about current smoking habits.  Pt. has never smoked.          The following HM Due: NONE    25w6d  Caleb Cast CMA                "

## 2021-11-04 ENCOUNTER — PRENATAL OFFICE VISIT (OUTPATIENT)
Dept: OBGYN | Facility: CLINIC | Age: 36
End: 2021-11-04
Payer: COMMERCIAL

## 2021-11-04 VITALS — DIASTOLIC BLOOD PRESSURE: 76 MMHG | WEIGHT: 258 LBS | SYSTOLIC BLOOD PRESSURE: 116 MMHG | BODY MASS INDEX: 37.02 KG/M2

## 2021-11-04 DIAGNOSIS — O09.523 MULTIGRAVIDA OF ADVANCED MATERNAL AGE IN THIRD TRIMESTER: ICD-10-CM

## 2021-11-04 DIAGNOSIS — O99.213 OBESITY AFFECTING PREGNANCY IN THIRD TRIMESTER: ICD-10-CM

## 2021-11-04 DIAGNOSIS — O09.93 HIGH-RISK PREGNANCY IN THIRD TRIMESTER: Primary | ICD-10-CM

## 2021-11-04 DIAGNOSIS — Z23 NEED FOR TDAP VACCINATION: ICD-10-CM

## 2021-11-04 LAB
ERYTHROCYTE [DISTWIDTH] IN BLOOD BY AUTOMATED COUNT: 13.7 % (ref 10–15)
HCT VFR BLD AUTO: 32.9 % (ref 35–47)
HGB BLD-MCNC: 10.7 G/DL (ref 11.7–15.7)
HOLD SPECIMEN: NORMAL
MCH RBC QN AUTO: 29.7 PG (ref 26.5–33)
MCHC RBC AUTO-ENTMCNC: 32.5 G/DL (ref 31.5–36.5)
MCV RBC AUTO: 91 FL (ref 78–100)
PLATELET # BLD AUTO: 229 10E3/UL (ref 150–450)
RBC # BLD AUTO: 3.6 10E6/UL (ref 3.8–5.2)
WBC # BLD AUTO: 8.3 10E3/UL (ref 4–11)

## 2021-11-04 PROCEDURE — 36415 COLL VENOUS BLD VENIPUNCTURE: CPT | Performed by: ADVANCED PRACTICE MIDWIFE

## 2021-11-04 PROCEDURE — 83550 IRON BINDING TEST: CPT | Performed by: ADVANCED PRACTICE MIDWIFE

## 2021-11-04 PROCEDURE — 90715 TDAP VACCINE 7 YRS/> IM: CPT | Performed by: ADVANCED PRACTICE MIDWIFE

## 2021-11-04 PROCEDURE — 82952 GTT-ADDED SAMPLES: CPT | Performed by: ADVANCED PRACTICE MIDWIFE

## 2021-11-04 PROCEDURE — 86780 TREPONEMA PALLIDUM: CPT | Performed by: ADVANCED PRACTICE MIDWIFE

## 2021-11-04 PROCEDURE — 82728 ASSAY OF FERRITIN: CPT | Performed by: ADVANCED PRACTICE MIDWIFE

## 2021-11-04 PROCEDURE — 90471 IMMUNIZATION ADMIN: CPT | Performed by: ADVANCED PRACTICE MIDWIFE

## 2021-11-04 PROCEDURE — 85027 COMPLETE CBC AUTOMATED: CPT | Performed by: ADVANCED PRACTICE MIDWIFE

## 2021-11-04 PROCEDURE — 99207 PR PRENATAL VISIT: CPT | Performed by: ADVANCED PRACTICE MIDWIFE

## 2021-11-04 NOTE — PROGRESS NOTES
S: Feels well, no concerns. No uterine contractions, vaginal bleeding, or LOF. Baby active daily. Wonders when it would be best to get Covid booster. Thinking about postpartum and the antibodies provided both baby and maybe toddler through breastfeeding but is open to receiving in pregnancy. Baby active.     O: Vitals: /76 (BP Location: Left arm, Cuff Size: Adult Regular)   Wt 117 kg (258 lb)   LMP 04/22/2021   BMI 37.02 kg/m    BMI= Body mass index is 37.02 kg/m .     EPDS=1    Exam:  Constitutional: Healthy, alert and no distress  Respiratory: Respirations even and unlabored  Gastrointestinal: Abdomen soft, non-tender. Fundus measures appropriate for gestational age. Fetal heart tones heard without difficulty and within normal limits  : Deferred  Psychiatric: Mentation appears normal and affect normal/bright    A/P:  (O09.93) High-risk pregnancy in third trimester  (primary encounter diagnosis)  (O09.523) Multigravida of advanced maternal age in third trimester  Plan: Glucose tolerance gest screen 1 hour, CBC with         Platelets and Reflex to Iron Studies, Treponema        Abs w Reflex to RPR and Titer, TDAP         (ADACEL,BOOSTRIX)  - GCT/repeat CBC/RPR today. Discussed new anemia guidelines and implications of iron studies if needed. Reviewed dietary sources high in iron; patient's daily PNV includes iron.  - Tdap given; reviewed CDC recommendations and partner/family vaccination recommended as well.  - Discussed timing of booster vaccine for Covid; encouraged getting it now vs waiting postpartum. ACOG advisory discussed and link given in AVS.  - Reviewed fetal kick counts/normal third trimester movement  - Considering minipill again for postpartum contraception    (O99.213) Obesity affecting pregnancy in third trimester  - Reviewed TWG 3 lbs with appropriate interval gain, within recommended range  - Continue baby aspirin daily  - Consider growth ultrasounds every 4-6 weeks for current BMI 37  -  Discussed option for elective IOL at 39 weeks    (Z23) Need for Tdap vaccination  Plan: TDAP (ADACEL,BOOSTRIX)      Encouraged patient to call with any questions or concerns.  Return to clinic in 2 weeks    Sarai Regalado DNP, APRN, CNM

## 2021-11-04 NOTE — PATIENT INSTRUCTIONS
[facility] Practice Advisory: COVID-19 Vaccination Considerations for Obstetric-Gynecologic Care  https://www.acog.org/clinical/clinical-guidance/practice-advisory/articles/2020/12/covid-19-vaccination-considerations-for-obstetric-gynecologic-care      Weeks 27 thru 32 - Gestational Age (Fetal Age - Weeks 25 thru 30):  The fetus really fills out over these next few weeks, storing fat on the body, reaching about 15-17 inches long and weighing about 4-4   lbs by the 32nd week. The lungs are not fully mature yet, but some rhythmic breathing movements are occurring. The bones are fully developed, but are still soft and pliable. The fetus is storing its own calcium, iron and phosphorus. The eyelids open after being closed, since the end of the first trimester.    Fetal Kick Counts    It is important to know when your baby's movements occur. We often get busy with work and life and do not pay close attention to their movements.        Women typically begin feeling movement between 18-22 weeks of gestation, sometimes it can be earlier or later depending on where your placenta is       Movements usually begin feeling like popping or fluttering and as the baby grows they become more pronounced    Toward the end of pregnancy as the baby gets larger they may not move as much or make as big of movements. Babies have maturing sleep cycles as well as not as much room to move and flip. If you are ever concerned about your baby's movements or have not felt the baby move for a while, we recommend you do a fetal kick count. Prior to starting your count drink a glass of water or juice and eat a snack. Then lay down on your side and begin to count movements.     How to do a Fetal Kick Counts    There are many different ways to monitor your baby's movements. Movements can range from large jabs to small kicks, or wiggles.  Hiccups count!      Count 10 movements in 2 hours when resting and focusing    Count 10 movements in 12 hours when doing  normal activity    We recommend that if movements occur but seem decreased that you should be seen in the clinic or hospital for evaluation within 12 hours. If fetal movement is absent or fetal kick counts are low please contact us right away.    If you ever have any concerns about your baby's movements DO NOT HESITATE to call us, we are here for you!    [facility]  [phone]

## 2021-11-04 NOTE — NURSING NOTE
"Chief Complaint   Patient presents with     Prenatal Care     28w GCT and tdap today       Initial /76 (BP Location: Left arm, Cuff Size: Adult Regular)   Wt 117 kg (258 lb)   LMP 2021   BMI 37.02 kg/m   Estimated body mass index is 37.02 kg/m  as calculated from the following:    Height as of 21: 1.778 m (5' 10\").    Weight as of this encounter: 117 kg (258 lb).  BP completed using cuff size: regular long    Questioned patient about current smoking habits.  Pt. has never smoked.          The following HM Due: NONE    "

## 2021-11-05 LAB
FERRITIN SERPL-MCNC: 24 NG/ML (ref 12–150)
GLUCOSE 1H P 50 G GLC PO SERPL-MCNC: 143 MG/DL (ref 70–129)
IRON SATN MFR SERPL: 12 % (ref 15–46)
IRON SERPL-MCNC: 50 UG/DL (ref 35–180)
T PALLIDUM AB SER QL: NONREACTIVE
TIBC SERPL-MCNC: 424 UG/DL (ref 240–430)

## 2021-11-08 DIAGNOSIS — O99.013 ANEMIA AFFECTING PREGNANCY IN THIRD TRIMESTER: Primary | ICD-10-CM

## 2021-11-08 DIAGNOSIS — O99.810 ABNORMAL O'SULLIVAN GLUCOSE CHALLENGE TEST, ANTEPARTUM: ICD-10-CM

## 2021-11-08 RX ORDER — ALBUTEROL SULFATE 0.83 MG/ML
2.5 SOLUTION RESPIRATORY (INHALATION)
Status: CANCELLED | OUTPATIENT
Start: 2021-11-09

## 2021-11-08 RX ORDER — DIPHENHYDRAMINE HYDROCHLORIDE 50 MG/ML
50 INJECTION INTRAMUSCULAR; INTRAVENOUS
Status: CANCELLED
Start: 2021-11-09

## 2021-11-08 RX ORDER — MEPERIDINE HYDROCHLORIDE 25 MG/ML
25 INJECTION INTRAMUSCULAR; INTRAVENOUS; SUBCUTANEOUS EVERY 30 MIN PRN
Status: CANCELLED | OUTPATIENT
Start: 2021-11-09

## 2021-11-08 RX ORDER — NALOXONE HYDROCHLORIDE 0.4 MG/ML
0.2 INJECTION, SOLUTION INTRAMUSCULAR; INTRAVENOUS; SUBCUTANEOUS
Status: CANCELLED | OUTPATIENT
Start: 2021-11-09

## 2021-11-08 RX ORDER — EPINEPHRINE 1 MG/ML
0.3 INJECTION, SOLUTION, CONCENTRATE INTRAVENOUS EVERY 5 MIN PRN
Status: CANCELLED | OUTPATIENT
Start: 2021-11-09

## 2021-11-08 RX ORDER — HEPARIN SODIUM,PORCINE 10 UNIT/ML
5 VIAL (ML) INTRAVENOUS
Status: CANCELLED | OUTPATIENT
Start: 2021-11-09

## 2021-11-08 RX ORDER — HEPARIN SODIUM (PORCINE) LOCK FLUSH IV SOLN 100 UNIT/ML 100 UNIT/ML
5 SOLUTION INTRAVENOUS
Status: CANCELLED | OUTPATIENT
Start: 2021-11-09

## 2021-11-08 RX ORDER — METHYLPREDNISOLONE SODIUM SUCCINATE 125 MG/2ML
125 INJECTION, POWDER, LYOPHILIZED, FOR SOLUTION INTRAMUSCULAR; INTRAVENOUS
Status: CANCELLED
Start: 2021-11-09

## 2021-11-08 RX ORDER — ALBUTEROL SULFATE 90 UG/1
1-2 AEROSOL, METERED RESPIRATORY (INHALATION)
Status: CANCELLED
Start: 2021-11-09

## 2021-11-09 RX ORDER — ALBUTEROL SULFATE 0.83 MG/ML
2.5 SOLUTION RESPIRATORY (INHALATION)
Status: CANCELLED | OUTPATIENT
Start: 2021-11-09

## 2021-11-09 RX ORDER — HEPARIN SODIUM,PORCINE 10 UNIT/ML
5 VIAL (ML) INTRAVENOUS
Status: CANCELLED | OUTPATIENT
Start: 2021-11-09

## 2021-11-09 RX ORDER — MEPERIDINE HYDROCHLORIDE 25 MG/ML
25 INJECTION INTRAMUSCULAR; INTRAVENOUS; SUBCUTANEOUS EVERY 30 MIN PRN
Status: CANCELLED | OUTPATIENT
Start: 2021-11-09

## 2021-11-09 RX ORDER — HEPARIN SODIUM (PORCINE) LOCK FLUSH IV SOLN 100 UNIT/ML 100 UNIT/ML
5 SOLUTION INTRAVENOUS
Status: CANCELLED | OUTPATIENT
Start: 2021-11-09

## 2021-11-09 RX ORDER — METHYLPREDNISOLONE SODIUM SUCCINATE 125 MG/2ML
125 INJECTION, POWDER, LYOPHILIZED, FOR SOLUTION INTRAMUSCULAR; INTRAVENOUS
Status: CANCELLED
Start: 2021-11-09

## 2021-11-09 RX ORDER — ALBUTEROL SULFATE 90 UG/1
1-2 AEROSOL, METERED RESPIRATORY (INHALATION)
Status: CANCELLED
Start: 2021-11-09

## 2021-11-09 RX ORDER — EPINEPHRINE 1 MG/ML
0.3 INJECTION, SOLUTION, CONCENTRATE INTRAVENOUS EVERY 5 MIN PRN
Status: CANCELLED | OUTPATIENT
Start: 2021-11-09

## 2021-11-09 RX ORDER — DIPHENHYDRAMINE HYDROCHLORIDE 50 MG/ML
50 INJECTION INTRAMUSCULAR; INTRAVENOUS
Status: CANCELLED
Start: 2021-11-09

## 2021-11-09 RX ORDER — NALOXONE HYDROCHLORIDE 0.4 MG/ML
0.2 INJECTION, SOLUTION INTRAMUSCULAR; INTRAVENOUS; SUBCUTANEOUS
Status: CANCELLED | OUTPATIENT
Start: 2021-11-09

## 2021-11-13 ENCOUNTER — LAB (OUTPATIENT)
Dept: URGENT CARE | Facility: URGENT CARE | Age: 36
End: 2021-11-13
Attending: ADVANCED PRACTICE MIDWIFE
Payer: COMMERCIAL

## 2021-11-13 DIAGNOSIS — O99.013 ANEMIA AFFECTING PREGNANCY IN THIRD TRIMESTER: ICD-10-CM

## 2021-11-13 PROCEDURE — U0005 INFEC AGEN DETEC AMPLI PROBE: HCPCS

## 2021-11-13 PROCEDURE — U0003 INFECTIOUS AGENT DETECTION BY NUCLEIC ACID (DNA OR RNA); SEVERE ACUTE RESPIRATORY SYNDROME CORONAVIRUS 2 (SARS-COV-2) (CORONAVIRUS DISEASE [COVID-19]), AMPLIFIED PROBE TECHNIQUE, MAKING USE OF HIGH THROUGHPUT TECHNOLOGIES AS DESCRIBED BY CMS-2020-01-R: HCPCS

## 2021-11-14 LAB — SARS-COV-2 RNA RESP QL NAA+PROBE: NEGATIVE

## 2021-11-15 ENCOUNTER — INFUSION THERAPY VISIT (OUTPATIENT)
Dept: INFUSION THERAPY | Facility: CLINIC | Age: 36
End: 2021-11-15
Attending: ADVANCED PRACTICE MIDWIFE
Payer: COMMERCIAL

## 2021-11-15 VITALS
OXYGEN SATURATION: 98 % | SYSTOLIC BLOOD PRESSURE: 123 MMHG | RESPIRATION RATE: 16 BRPM | HEART RATE: 93 BPM | DIASTOLIC BLOOD PRESSURE: 80 MMHG | TEMPERATURE: 97.4 F

## 2021-11-15 DIAGNOSIS — O99.013 ANEMIA AFFECTING PREGNANCY IN THIRD TRIMESTER: Primary | ICD-10-CM

## 2021-11-15 PROCEDURE — 258N000003 HC RX IP 258 OP 636: Performed by: ADVANCED PRACTICE MIDWIFE

## 2021-11-15 PROCEDURE — 250N000011 HC RX IP 250 OP 636: Performed by: ADVANCED PRACTICE MIDWIFE

## 2021-11-15 PROCEDURE — 96365 THER/PROPH/DIAG IV INF INIT: CPT

## 2021-11-15 PROCEDURE — 96366 THER/PROPH/DIAG IV INF ADDON: CPT

## 2021-11-15 RX ORDER — DIPHENHYDRAMINE HYDROCHLORIDE 50 MG/ML
50 INJECTION INTRAMUSCULAR; INTRAVENOUS
Status: CANCELLED
Start: 2021-11-18

## 2021-11-15 RX ORDER — ALBUTEROL SULFATE 90 UG/1
1-2 AEROSOL, METERED RESPIRATORY (INHALATION)
Status: CANCELLED
Start: 2021-11-18

## 2021-11-15 RX ORDER — NALOXONE HYDROCHLORIDE 0.4 MG/ML
0.2 INJECTION, SOLUTION INTRAMUSCULAR; INTRAVENOUS; SUBCUTANEOUS
Status: CANCELLED | OUTPATIENT
Start: 2021-11-18

## 2021-11-15 RX ORDER — UBIDECARENONE 75 MG
1000 CAPSULE ORAL DAILY
COMMUNITY
End: 2024-01-10

## 2021-11-15 RX ORDER — MULTIVIT WITH MINERALS/LUTEIN
250 TABLET ORAL DAILY
COMMUNITY
End: 2024-01-10

## 2021-11-15 RX ORDER — ALBUTEROL SULFATE 0.83 MG/ML
2.5 SOLUTION RESPIRATORY (INHALATION)
Status: CANCELLED | OUTPATIENT
Start: 2021-11-18

## 2021-11-15 RX ORDER — METHYLPREDNISOLONE SODIUM SUCCINATE 125 MG/2ML
125 INJECTION, POWDER, LYOPHILIZED, FOR SOLUTION INTRAMUSCULAR; INTRAVENOUS
Status: CANCELLED
Start: 2021-11-18

## 2021-11-15 RX ORDER — MEPERIDINE HYDROCHLORIDE 25 MG/ML
25 INJECTION INTRAMUSCULAR; INTRAVENOUS; SUBCUTANEOUS EVERY 30 MIN PRN
Status: CANCELLED | OUTPATIENT
Start: 2021-11-18

## 2021-11-15 RX ORDER — HEPARIN SODIUM (PORCINE) LOCK FLUSH IV SOLN 100 UNIT/ML 100 UNIT/ML
5 SOLUTION INTRAVENOUS
Status: CANCELLED | OUTPATIENT
Start: 2021-11-18

## 2021-11-15 RX ORDER — EPINEPHRINE 1 MG/ML
0.3 INJECTION, SOLUTION INTRAMUSCULAR; SUBCUTANEOUS EVERY 5 MIN PRN
Status: CANCELLED | OUTPATIENT
Start: 2021-11-18

## 2021-11-15 RX ORDER — HEPARIN SODIUM,PORCINE 10 UNIT/ML
5 VIAL (ML) INTRAVENOUS
Status: CANCELLED | OUTPATIENT
Start: 2021-11-18

## 2021-11-15 RX ADMIN — IRON SUCROSE 300 MG: 20 INJECTION, SOLUTION INTRAVENOUS at 14:41

## 2021-11-15 RX ADMIN — SODIUM CHLORIDE 250 ML: 9 INJECTION, SOLUTION INTRAVENOUS at 14:40

## 2021-11-15 NOTE — PATIENT INSTRUCTIONS
Weeks 27 thru 32 - Gestational Age (Fetal Age - Weeks 25 thru 30):  The fetus really fills out over these next few weeks, storing fat on the body, reaching about 15-17 inches long and weighing about 4-4   lbs by the 32nd week. The lungs are not fully mature yet, but some rhythmic breathing movements are occurring. The bones are fully developed, but are still soft and pliable. The fetus is storing its own calcium, iron and phosphorus. The eyelids open after being closed, since the end of the first trimester        If you need any refills of medications please call your pharmacy and they will contact us      If you have any questions or concerns before your next visit, you can reach the nurse midwife on call by calling the clinic number at [phone].      If you  wish to schedule another appointment, please call our office at [phone]. You can also make appointments through [facility]        If you have a medical emergency please call [phone].

## 2021-11-15 NOTE — PROGRESS NOTES
Infusion Nursing Note:  Rivka Xie presents today for Venofer infusion 1 of 3.    Patient seen by provider today: No   present during visit today: Not Applicable.    Note: N/A.      Intravenous Access:  Peripheral IV placed.    Treatment Conditions:  Not Applicable.      Post Infusion Assessment:  Patient tolerated infusion without incident.  Blood return noted pre and post infusion.  Site patent and intact, free from redness, edema or discomfort.  No evidence of extravasations.  Access discontinued per protocol.       Discharge Plan:   Discharge instructions reviewed with: Patient.  Patient and/or family verbalized understanding of discharge instructions and all questions answered.  AVS to patient via GoPro.  Patient will return 11/17/21 for next appointment.   Patient discharged in stable condition accompanied by: self.  Departure Mode: Ambulatory.      Whitney Hinton RN

## 2021-11-15 NOTE — PROGRESS NOTES
S: Feels well.  Baby active.  Denies uterine cramping, vaginal bleeding or leaking of fluid. Received 2nd Venofer dose yesterday. And will receive 3rd tomorrow. Did GTT yesterday as well. Has questions regarding whether she will need growth US due to age and BMI.   O: Vitals: LMP 04/22/2021   BMI= Body mass index is 37.06 kg/m .  Exam:  Constitutional: healthy, alert and no distress  Respiratory: respirations even and unlabored  Gastrointestinal: Abdomen soft, non-tender. Fundus measures appropriate for gestational age. Fetal heart tones hear without difficulty and within normal limits  : Deferred  Psychiatric: mentation appears normal and affect normal/bright  A:     ICD-10-CM    1. High-risk pregnancy in third trimester  O09.93    2. Multigravida of advanced maternal age in third trimester  O09.523    3. Obesity affecting pregnancy in third trimester  O99.213      P: Discussed plans for labor.  Open birth plan. Liked hydrotherapy in the tub and shower last time. Wants to be more mobile this labor. Discussed patient options for postpartum contraception. Patient is still deciding on contraception, might want to use minipill.   Encouraged patient to call with any questions or concerns.  Will recheck CBC 2 weeks after last Venofer infusion. Last one tomorrow. Plan on CBC next prenatal visit.   GTT yesterday: All values WNL.   Tdap completed 11/4/2021.  Covid booster- still thinking about when to get it. Probably thinking around chrstmas or new years.   Discussed that the recommendations for her age and BMI include watching growth closely and ordering growth US if there are concerns but that at this time there is no medical indication.     Return to clinic 2 weeks    ALMA Rodarte CNM

## 2021-11-17 ENCOUNTER — LAB (OUTPATIENT)
Dept: LAB | Facility: CLINIC | Age: 36
End: 2021-11-17
Payer: COMMERCIAL

## 2021-11-17 ENCOUNTER — INFUSION THERAPY VISIT (OUTPATIENT)
Dept: INFUSION THERAPY | Facility: CLINIC | Age: 36
End: 2021-11-17
Attending: ADVANCED PRACTICE MIDWIFE
Payer: COMMERCIAL

## 2021-11-17 VITALS
TEMPERATURE: 97.6 F | RESPIRATION RATE: 16 BRPM | SYSTOLIC BLOOD PRESSURE: 116 MMHG | OXYGEN SATURATION: 97 % | DIASTOLIC BLOOD PRESSURE: 71 MMHG | HEART RATE: 93 BPM

## 2021-11-17 DIAGNOSIS — O99.013 ANEMIA AFFECTING PREGNANCY IN THIRD TRIMESTER: Primary | ICD-10-CM

## 2021-11-17 DIAGNOSIS — O99.810 ABNORMAL O'SULLIVAN GLUCOSE CHALLENGE TEST, ANTEPARTUM: ICD-10-CM

## 2021-11-17 LAB — GLUCOSE P FAST SERPL-MCNC: 91 MG/DL (ref 60–94)

## 2021-11-17 PROCEDURE — 96365 THER/PROPH/DIAG IV INF INIT: CPT

## 2021-11-17 PROCEDURE — 82952 GTT-ADDED SAMPLES: CPT

## 2021-11-17 PROCEDURE — 96366 THER/PROPH/DIAG IV INF ADDON: CPT

## 2021-11-17 PROCEDURE — 82951 GLUCOSE TOLERANCE TEST (GTT): CPT

## 2021-11-17 PROCEDURE — 258N000003 HC RX IP 258 OP 636: Performed by: ADVANCED PRACTICE MIDWIFE

## 2021-11-17 PROCEDURE — 250N000011 HC RX IP 250 OP 636: Performed by: ADVANCED PRACTICE MIDWIFE

## 2021-11-17 PROCEDURE — 36415 COLL VENOUS BLD VENIPUNCTURE: CPT

## 2021-11-17 RX ORDER — NALOXONE HYDROCHLORIDE 0.4 MG/ML
0.2 INJECTION, SOLUTION INTRAMUSCULAR; INTRAVENOUS; SUBCUTANEOUS
Status: CANCELLED | OUTPATIENT
Start: 2021-11-18

## 2021-11-17 RX ORDER — DIPHENHYDRAMINE HYDROCHLORIDE 50 MG/ML
50 INJECTION INTRAMUSCULAR; INTRAVENOUS
Status: CANCELLED
Start: 2021-11-18

## 2021-11-17 RX ORDER — HEPARIN SODIUM,PORCINE 10 UNIT/ML
5 VIAL (ML) INTRAVENOUS
Status: CANCELLED | OUTPATIENT
Start: 2021-11-18

## 2021-11-17 RX ORDER — HEPARIN SODIUM (PORCINE) LOCK FLUSH IV SOLN 100 UNIT/ML 100 UNIT/ML
5 SOLUTION INTRAVENOUS
Status: CANCELLED | OUTPATIENT
Start: 2021-11-18

## 2021-11-17 RX ORDER — ALBUTEROL SULFATE 0.83 MG/ML
2.5 SOLUTION RESPIRATORY (INHALATION)
Status: CANCELLED | OUTPATIENT
Start: 2021-11-18

## 2021-11-17 RX ORDER — MEPERIDINE HYDROCHLORIDE 25 MG/ML
25 INJECTION INTRAMUSCULAR; INTRAVENOUS; SUBCUTANEOUS EVERY 30 MIN PRN
Status: CANCELLED | OUTPATIENT
Start: 2021-11-18

## 2021-11-17 RX ORDER — ALBUTEROL SULFATE 90 UG/1
1-2 AEROSOL, METERED RESPIRATORY (INHALATION)
Status: CANCELLED
Start: 2021-11-18

## 2021-11-17 RX ORDER — EPINEPHRINE 1 MG/ML
0.3 INJECTION, SOLUTION INTRAMUSCULAR; SUBCUTANEOUS EVERY 5 MIN PRN
Status: CANCELLED | OUTPATIENT
Start: 2021-11-18

## 2021-11-17 RX ORDER — METHYLPREDNISOLONE SODIUM SUCCINATE 125 MG/2ML
125 INJECTION, POWDER, LYOPHILIZED, FOR SOLUTION INTRAMUSCULAR; INTRAVENOUS
Status: CANCELLED
Start: 2021-11-18

## 2021-11-17 RX ADMIN — IRON SUCROSE 300 MG: 20 INJECTION, SOLUTION INTRAVENOUS at 14:21

## 2021-11-17 RX ADMIN — SODIUM CHLORIDE 250 ML: 9 INJECTION, SOLUTION INTRAVENOUS at 14:19

## 2021-11-17 NOTE — PROGRESS NOTES
Infusion Nursing Note:  Rivka Blank Xie presents today for VENOFER 2 OF 3.    Patient seen by provider today: No   present during visit today: Not Applicable.    Note: N/A.      Intravenous Access:  Peripheral IV placed.    Treatment Conditions:  Not Applicable.      Post Infusion Assessment:  Patient tolerated infusion without incident.  Blood return noted pre and post infusion.  Site patent and intact, free from redness, edema or discomfort.  No evidence of extravasations.  Access discontinued per protocol.       Discharge Plan:   Discharge instructions reviewed with: Patient.  Patient and/or family verbalized understanding of discharge instructions and all questions answered.  AVS to patient via [employer].  Patient will return 11/19/21 for next appointment.   Patient discharged in stable condition accompanied by: self.  Departure Mode: Ambulatory.      Whitney Hinton RN

## 2021-11-18 ENCOUNTER — PRENATAL OFFICE VISIT (OUTPATIENT)
Dept: OBGYN | Facility: CLINIC | Age: 36
End: 2021-11-18
Payer: COMMERCIAL

## 2021-11-18 VITALS — DIASTOLIC BLOOD PRESSURE: 64 MMHG | SYSTOLIC BLOOD PRESSURE: 108 MMHG | WEIGHT: 258.3 LBS | BODY MASS INDEX: 37.06 KG/M2

## 2021-11-18 DIAGNOSIS — O99.213 OBESITY AFFECTING PREGNANCY IN THIRD TRIMESTER: ICD-10-CM

## 2021-11-18 DIAGNOSIS — O09.523 MULTIGRAVIDA OF ADVANCED MATERNAL AGE IN THIRD TRIMESTER: ICD-10-CM

## 2021-11-18 DIAGNOSIS — O09.93 HIGH-RISK PREGNANCY IN THIRD TRIMESTER: Primary | ICD-10-CM

## 2021-11-18 LAB
GESTATIONAL GTT 1 HR POST DOSE: 155 MG/DL (ref 60–179)
GESTATIONAL GTT 2 HR POST DOSE: 140 MG/DL (ref 60–154)
GESTATIONAL GTT 3 HR POST DOSE: 109 MG/DL (ref 60–139)

## 2021-11-18 PROCEDURE — 99207 PR PRENATAL VISIT: CPT

## 2021-11-18 NOTE — NURSING NOTE
"Chief Complaint   Patient presents with     Prenatal Care       Initial /64 (BP Location: Left arm, Cuff Size: Adult Regular)   Wt 117.2 kg (258 lb 4.8 oz)   LMP 2021   Breastfeeding No   BMI 37.06 kg/m   Estimated body mass index is 37.06 kg/m  as calculated from the following:    Height as of 21: 1.778 m (5' 10\").    Weight as of this encounter: 117.2 kg (258 lb 4.8 oz).  BP completed using cuff size: regular    Questioned patient about current smoking habits.  Pt. has never smoked.               "

## 2021-11-19 ENCOUNTER — INFUSION THERAPY VISIT (OUTPATIENT)
Dept: INFUSION THERAPY | Facility: CLINIC | Age: 36
End: 2021-11-19
Attending: ADVANCED PRACTICE MIDWIFE
Payer: COMMERCIAL

## 2021-11-19 VITALS
DIASTOLIC BLOOD PRESSURE: 78 MMHG | HEART RATE: 107 BPM | SYSTOLIC BLOOD PRESSURE: 114 MMHG | OXYGEN SATURATION: 98 % | TEMPERATURE: 98 F | RESPIRATION RATE: 16 BRPM

## 2021-11-19 DIAGNOSIS — O99.013 ANEMIA AFFECTING PREGNANCY IN THIRD TRIMESTER: Primary | ICD-10-CM

## 2021-11-19 PROCEDURE — 96365 THER/PROPH/DIAG IV INF INIT: CPT

## 2021-11-19 PROCEDURE — 96366 THER/PROPH/DIAG IV INF ADDON: CPT

## 2021-11-19 PROCEDURE — 250N000011 HC RX IP 250 OP 636: Performed by: ADVANCED PRACTICE MIDWIFE

## 2021-11-19 PROCEDURE — 258N000003 HC RX IP 258 OP 636: Performed by: ADVANCED PRACTICE MIDWIFE

## 2021-11-19 RX ORDER — METHYLPREDNISOLONE SODIUM SUCCINATE 125 MG/2ML
125 INJECTION, POWDER, LYOPHILIZED, FOR SOLUTION INTRAMUSCULAR; INTRAVENOUS
Status: CANCELLED
Start: 2021-11-21

## 2021-11-19 RX ORDER — MEPERIDINE HYDROCHLORIDE 25 MG/ML
25 INJECTION INTRAMUSCULAR; INTRAVENOUS; SUBCUTANEOUS EVERY 30 MIN PRN
Status: CANCELLED | OUTPATIENT
Start: 2021-11-21

## 2021-11-19 RX ORDER — HEPARIN SODIUM (PORCINE) LOCK FLUSH IV SOLN 100 UNIT/ML 100 UNIT/ML
5 SOLUTION INTRAVENOUS
Status: CANCELLED | OUTPATIENT
Start: 2021-11-21

## 2021-11-19 RX ORDER — HEPARIN SODIUM,PORCINE 10 UNIT/ML
5 VIAL (ML) INTRAVENOUS
Status: CANCELLED | OUTPATIENT
Start: 2021-11-21

## 2021-11-19 RX ORDER — ALBUTEROL SULFATE 0.83 MG/ML
2.5 SOLUTION RESPIRATORY (INHALATION)
Status: CANCELLED | OUTPATIENT
Start: 2021-11-21

## 2021-11-19 RX ORDER — ALBUTEROL SULFATE 90 UG/1
1-2 AEROSOL, METERED RESPIRATORY (INHALATION)
Status: CANCELLED
Start: 2021-11-21

## 2021-11-19 RX ORDER — NALOXONE HYDROCHLORIDE 0.4 MG/ML
0.2 INJECTION, SOLUTION INTRAMUSCULAR; INTRAVENOUS; SUBCUTANEOUS
Status: CANCELLED | OUTPATIENT
Start: 2021-11-21

## 2021-11-19 RX ORDER — EPINEPHRINE 1 MG/ML
0.3 INJECTION, SOLUTION INTRAMUSCULAR; SUBCUTANEOUS EVERY 5 MIN PRN
Status: CANCELLED | OUTPATIENT
Start: 2021-11-21

## 2021-11-19 RX ORDER — DIPHENHYDRAMINE HYDROCHLORIDE 50 MG/ML
50 INJECTION INTRAMUSCULAR; INTRAVENOUS
Status: CANCELLED
Start: 2021-11-21

## 2021-11-19 RX ADMIN — IRON SUCROSE 300 MG: 20 INJECTION, SOLUTION INTRAVENOUS at 14:29

## 2021-11-19 RX ADMIN — SODIUM CHLORIDE 250 ML: 9 INJECTION, SOLUTION INTRAVENOUS at 14:28

## 2021-11-19 NOTE — PROGRESS NOTES
Infusion Nursing Note:  Rivka Xie presents today for Venofer 3 of 3.    Patient seen by provider today: No   present during visit today: Not Applicable.    Note: N/A.      Intravenous Access:  Peripheral IV placed.    Treatment Conditions:  Not Applicable.      Post Infusion Assessment:  Patient tolerated infusion without incident.  Blood return noted pre and post infusion.  Site patent and intact, free from redness, edema or discomfort.  No evidence of extravasations.  Access discontinued per protocol.       Discharge Plan:   Discharge instructions reviewed with: Patient.  Patient and/or family verbalized understanding of discharge instructions and all questions answered.  Patient discharged in stable condition accompanied by: self.  Departure Mode: Ambulatory.      Cassie Bernabe RN

## 2021-12-01 ENCOUNTER — PRENATAL OFFICE VISIT (OUTPATIENT)
Dept: OBGYN | Facility: CLINIC | Age: 36
End: 2021-12-01
Payer: COMMERCIAL

## 2021-12-01 VITALS — SYSTOLIC BLOOD PRESSURE: 116 MMHG | DIASTOLIC BLOOD PRESSURE: 72 MMHG | WEIGHT: 258.5 LBS | BODY MASS INDEX: 37.09 KG/M2

## 2021-12-01 DIAGNOSIS — O99.013 ANEMIA DURING PREGNANCY IN THIRD TRIMESTER: Primary | ICD-10-CM

## 2021-12-01 DIAGNOSIS — Z34.93 PRENATAL CARE IN THIRD TRIMESTER: ICD-10-CM

## 2021-12-01 LAB
ERYTHROCYTE [DISTWIDTH] IN BLOOD BY AUTOMATED COUNT: 14.7 % (ref 10–15)
HCT VFR BLD AUTO: 33.3 % (ref 35–47)
HGB BLD-MCNC: 10.8 G/DL (ref 11.7–15.7)
MCH RBC QN AUTO: 30.2 PG (ref 26.5–33)
MCHC RBC AUTO-ENTMCNC: 32.4 G/DL (ref 31.5–36.5)
MCV RBC AUTO: 93 FL (ref 78–100)
PLATELET # BLD AUTO: 216 10E3/UL (ref 150–450)
RBC # BLD AUTO: 3.58 10E6/UL (ref 3.8–5.2)
WBC # BLD AUTO: 8.4 10E3/UL (ref 4–11)

## 2021-12-01 PROCEDURE — 36415 COLL VENOUS BLD VENIPUNCTURE: CPT | Performed by: ADVANCED PRACTICE MIDWIFE

## 2021-12-01 PROCEDURE — 99207 PR PRENATAL VISIT: CPT | Performed by: ADVANCED PRACTICE MIDWIFE

## 2021-12-01 PROCEDURE — 85027 COMPLETE CBC AUTOMATED: CPT | Performed by: ADVANCED PRACTICE MIDWIFE

## 2021-12-01 NOTE — NURSING NOTE
"Chief Complaint   Patient presents with     Prenatal Care     31w 6d       Initial /72 (BP Location: Left arm, Cuff Size: Adult Regular)   Wt 117.3 kg (258 lb 8 oz)   LMP 2021   Breastfeeding No   BMI 37.09 kg/m   Estimated body mass index is 37.09 kg/m  as calculated from the following:    Height as of 21: 1.778 m (5' 10\").    Weight as of this encounter: 117.3 kg (258 lb 8 oz).  BP completed using cuff size: regular    Questioned patient about current smoking habits.  Pt. has never smoked.          "

## 2021-12-01 NOTE — PROGRESS NOTES
Feeling well.  Baby is active. Denies any leaking of fluid, vaginal bleeding, regular uterine contractions, or headaches or other concerns.  Had iron infusions.  Plan to check CBC today.  Encouraged oral iron daily or every 2 days to try to prevent the need for more infusions unless hemoglobin is high today.    Reviewed to call for contractions, loss of fluid, vaginal bleeding, decreased fetal movement or any other questions or concerns.    RTC in 2 weeks.  Jessica Almeida, TYLER, APRN, CNM

## 2021-12-15 ENCOUNTER — PRENATAL OFFICE VISIT (OUTPATIENT)
Dept: OBGYN | Facility: CLINIC | Age: 36
End: 2021-12-15
Payer: COMMERCIAL

## 2021-12-15 VITALS — DIASTOLIC BLOOD PRESSURE: 80 MMHG | WEIGHT: 259 LBS | BODY MASS INDEX: 37.16 KG/M2 | SYSTOLIC BLOOD PRESSURE: 106 MMHG

## 2021-12-15 DIAGNOSIS — O99.013 ANEMIA DURING PREGNANCY IN THIRD TRIMESTER: Primary | ICD-10-CM

## 2021-12-15 DIAGNOSIS — Z34.93 PRENATAL CARE IN THIRD TRIMESTER: ICD-10-CM

## 2021-12-15 PROCEDURE — 99207 PR PRENATAL VISIT: CPT | Performed by: ADVANCED PRACTICE MIDWIFE

## 2021-12-15 NOTE — PROGRESS NOTES
Feeling well.  Baby is active. Denies any leaking of fluid, vaginal bleeding, regular uterine contractions, or headaches or other concerns.  Finished iron infusions.  She is now taking daily iron and prenatal with iron regularly.    Reviewed wt gain.  She is eating to hunger and she thinks she is more active.  Reviewed to call for contractions, loss of fluid, vaginal bleeding, decreased fetal movement or any other questions or concerns.    RTC in 2 weeks.  Jessica Almeida, TYLER, APRN, CNM

## 2021-12-29 NOTE — PROGRESS NOTES
S: Feels good overall.  Baby active.  Denies uterine cramping, vaginal bleeding or leaking of fluid. No headache, increase in edema, no epigastric pain.     Increased pelvic pressure than with first. Relieved with position changes.     O: Vitals: /62 (BP Location: Right arm, Cuff Size: Adult Regular)   Wt 115.9 kg (255 lb 9.6 oz)   LMP 04/22/2021   Breastfeeding No   BMI 36.67 kg/m    BMI= Body mass index is 36.67 kg/m .  Exam:  Constitutional: healthy, alert and no distress  Respiratory: respirations even and unlabored  Gastrointestinal: Abdomen soft, non-tender. Fundus measures appropriate for gestational age. Fetal heart tones hear without difficulty and within normal limits  : Deferred  Psychiatric: mentation appears normal and affect normal/bright  Cephalic by bedside US.   A:     ICD-10-CM    1. Multigravida of advanced maternal age in third trimester  O09.523 Group B strep PCR   2. Anemia affecting pregnancy in third trimester  O99.013 CBC with platelets     CBC with platelets   3. High-risk pregnancy in third trimester  O09.93    4. Low weight gain during pregnancy, antepartum  O26.10      P: Labor signs and symptoms discussed, aware of numbers to call  Discussed warning signs of PIH/preeclampsia and patient will monitor.  GBS screen completed. CBC collected.  Discussed patient options for postpartum contraception. Patient is planning- minipill   ASA questions regarding stopping ASA at 36 weeks. Thinks someone mentioned this or maybe she read this. Discussed that current guidelines states that it can be taken until delivery as it has not been shown to increase risk of bleeding but that it would be ok to stop if she is more comfortable with this.   Answered questions about current COVID visitor guidelines.  Down 3.4 lbs since last visit- feels like she is getting enough nutrition, shares that her MIL is currently staying with them and she is a vegetarian so she feels like they are eating  healthier. Fundal height 34.5 today at 36 weeks. Discussed that this is non-concerning at this time but a growth US would be ordered if there is concern at next visit. Follow weight gain.   Taking iron with vitamin b and C. Did IV iron infusions earlier in this pregnancy and they didn't seem to help much. Will wait to see results of CBC today and give recommendations but assured that ultimately it is her decision.     Encouraged patient to call with any questions or concerns.  Return to clinic 1 weeks    ALMA Rodarte CNM

## 2021-12-30 ENCOUNTER — PRENATAL OFFICE VISIT (OUTPATIENT)
Dept: OBGYN | Facility: CLINIC | Age: 36
End: 2021-12-30
Payer: COMMERCIAL

## 2021-12-30 VITALS — SYSTOLIC BLOOD PRESSURE: 112 MMHG | BODY MASS INDEX: 36.67 KG/M2 | WEIGHT: 255.6 LBS | DIASTOLIC BLOOD PRESSURE: 62 MMHG

## 2021-12-30 DIAGNOSIS — O09.93 HIGH-RISK PREGNANCY IN THIRD TRIMESTER: ICD-10-CM

## 2021-12-30 DIAGNOSIS — O26.10 LOW WEIGHT GAIN DURING PREGNANCY, ANTEPARTUM: ICD-10-CM

## 2021-12-30 DIAGNOSIS — O99.013 ANEMIA AFFECTING PREGNANCY IN THIRD TRIMESTER: ICD-10-CM

## 2021-12-30 DIAGNOSIS — O09.523 MULTIGRAVIDA OF ADVANCED MATERNAL AGE IN THIRD TRIMESTER: Primary | ICD-10-CM

## 2021-12-30 LAB
ERYTHROCYTE [DISTWIDTH] IN BLOOD BY AUTOMATED COUNT: 14.5 % (ref 10–15)
HCT VFR BLD AUTO: 37 % (ref 35–47)
HGB BLD-MCNC: 12.3 G/DL (ref 11.7–15.7)
MCH RBC QN AUTO: 30.4 PG (ref 26.5–33)
MCHC RBC AUTO-ENTMCNC: 33.2 G/DL (ref 31.5–36.5)
MCV RBC AUTO: 91 FL (ref 78–100)
PLATELET # BLD AUTO: 207 10E3/UL (ref 150–450)
RBC # BLD AUTO: 4.05 10E6/UL (ref 3.8–5.2)
WBC # BLD AUTO: 8.3 10E3/UL (ref 4–11)

## 2021-12-30 PROCEDURE — 85027 COMPLETE CBC AUTOMATED: CPT

## 2021-12-30 PROCEDURE — 36415 COLL VENOUS BLD VENIPUNCTURE: CPT

## 2021-12-30 PROCEDURE — 87653 STREP B DNA AMP PROBE: CPT

## 2021-12-30 PROCEDURE — 99207 PR PRENATAL VISIT: CPT

## 2021-12-30 NOTE — PATIENT INSTRUCTIONS
Weeks 33 thru 36 - Gestational Age (Fetal Age - Weeks 31 thru 34):  This is about the time that the fetus will descend into the head down position preparing for birth. The fetus is beginning to gain weight more rapidly. The lanugo hair will disappear from the skin, and it is becoming less red and wrinkled. The fetus is now 16-19 inches and weighs anywhere from 5   lbs to 6   lbs.          Your GBS screen is pending. You will be informed of the results on or before your next visit. We would like to see you weekly for the remainder of your pregnancy. Please call with regular contractions that last longer than two hours, with any vaginal bleeding, leaking of fluid, or any other questions or concerns that you have.

## 2021-12-30 NOTE — NURSING NOTE
"Chief Complaint   Patient presents with     Prenatal Care       Initial /62 (BP Location: Right arm, Cuff Size: Adult Regular)   Wt 70.6 kg (155 lb 9.6 oz)   LMP 2021   Breastfeeding No   BMI 22.33 kg/m   Estimated body mass index is 22.33 kg/m  as calculated from the following:    Height as of 21: 1.778 m (5' 10\").    Weight as of this encounter: 70.6 kg (155 lb 9.6 oz).  BP completed using cuff size: regular    Questioned patient about current smoking habits.  Pt. has never smoked.          "

## 2021-12-31 ENCOUNTER — TELEPHONE (OUTPATIENT)
Dept: OBGYN | Facility: CLINIC | Age: 36
End: 2021-12-31
Payer: COMMERCIAL

## 2021-12-31 ENCOUNTER — NURSE TRIAGE (OUTPATIENT)
Dept: NURSING | Facility: CLINIC | Age: 36
End: 2021-12-31
Payer: COMMERCIAL

## 2021-12-31 NOTE — TELEPHONE ENCOUNTER
Patient calling with concerns about:    Fall on the steps in garage - has scrape on top of left toe and left knee    Does not think she hit her tummy at all but her shirt has dirt from garage floor on it so she is not sure.  Baby is moving as usual    Patient Denies:  Abdominal pain  Decreased fetal movement  Vaginal bleeding    According to the protocol, patient should be able to care for this at home. [facility] midwife is paged to contact patient to further advise.  Patient confirmed that she spoke with [facility] provider and she will stay at home monitoring fetal movement.    5:05 PM - [facility] midwife paged- Sue Lopez who called patient directly and advised that she could go into L&D to monitor or monitor fetal movement at home.    Radha Chung RN, Nurse Advisor 5:42 PM 12/31/2021    COVID 19 Nurse Triage Plan/Patient Instructions    Please be aware that novel coronavirus (COVID-19) may be circulating in the community. If you develop symptoms such as fever, cough, or SOB or if you have concerns about the presence of another infection including coronavirus (COVID-19), please contact your health care provider or visit [email or web address].     Disposition/Instructions    Home care recommended. Follow home care protocol based instructions.    Thank you for taking steps to prevent the spread of this virus.  o Limit your contact with others.  o Wear a simple mask to cover your cough.  o Wash your hands well and often.    Resources    [facility]: About COVID-19: [email or web address]    CDC: What to Do If You're Sick: www.cdc.gov/coronavirus/2019-ncov/about/steps-when-sick.html    CDC: Ending Home Isolation: www.cdc.gov/coronavirus/2019-ncov/hcp/disposition-in-home-patients.html     CDC: Caring for Someone: www.cdc.gov/coronavirus/2019-ncov/if-you-are-sick/care-for-someone.html     [facility]: Interim Guidance for Hospital Discharge to Home:  www.health.[facility].mn.us/diseases/coronavirus/hcp/hospdischarge.pdf    [facility] clinical trials (COVID-19 research studies): [email or web address]/umn-clinical-trials     Below are the COVID-19 hotlines at the [facility] ([facility]). Interpreters are available.   o For health questions: Call 758-986-9370 or 1-583.228.8136 (7 a.m. to 7 p.m.)  o For questions about schools and childcare: Call 260-024-8160 or 1-834.438.2151 (7 a.m. to 7 p.m.)     Reason for Disposition    Small bruise is present    Additional Information    Negative: Major injury from dangerous force (e.g., fall > 10 feet or 3 meters)    Negative: [1] Major bleeding (e.g., actively dripping or spurting) AND [2] can't be stopped    Negative: Shock suspected (e.g., cold/pale/clammy skin, too weak to stand)    Negative: SEVERE abdominal pain    Negative: [1] Vaginal bleeding AND [2] pregnant > 20 weeks    Negative: Sounds like a life-threatening emergency to the triager    Negative: Abdomen injury is main concern    Negative: Patient has an injury to a specific part of the body (e.g., arm, leg, head)    Negative: Patient  has a wound (abrasion, cut, puncture)    Negative: [1] Vaginal bleeding AND [2] pregnant < 20 weeks (Exception: single episode of spotting)    Negative: [1] Abdominal pain (not severe) AND [2] pregnant < 20 weeks    Negative: [1] Abdominal pain (not severe) AND [2] present > 1 hour    Negative: Sounds like a serious injury to the triager    Negative: [1] Pregnant 20 or more weeks AND [2] fall to ground or floor (e.g., walking and tripped)    Negative: [1] Pregnant 20 or more weeks AND [2] contractions    Negative: [1] Pregnant 20 or more weeks AND [2] abdomen pain    Negative: [1] Pregnant 20 or more weeks AND [2] leaking fluid    Negative: [1] Pregnant 23 or more weeks AND [2] no fetal movements or decreased fetal movements (e.g., kick count < 5 in 1 hour or < 10 in 2 hours)    Negative: Suspicious history  for the injury    Negative: [1] Last tetanus shot > 5 years ago AND [2] DIRTY cut or scrape    Negative: [1] Caller has NON-URGENT question AND [2] triager unable to answer question    Negative: Slipped or lost balance, but did not fall to ground or floor    Negative: [1] Pregnant < 20 weeks AND [2] fall to ground (e.g., walking and tripped)    Negative: [1] Caller has URGENT question AND [2] triager unable to answer question    Protocols used: PREGNANCY - FALL-A-

## 2021-12-31 NOTE — TELEPHONE ENCOUNTER
Phone Call:  Rivka Blank Rojas calls reporting a fall. She was leaving to go for a drive. She had three steps into the garage. She fell off one. She scraped knee and top of foot. She does not think she hit her abdomen. She has not abdominal tenderness.  She feels baby move and she has not had any change in baby movement.  Has follow up visit on Tuesday.   Reviewed options of going in for monitoring or for watchful waiting, given that she does not think and her story is not consistant with abdominal trauma.     She is a G2 1 at 36w1d weeks gestation. Frequency of contractions: none.   She is not leaking fluid.  She plans to birth at [facility]. Current location is home.   To contact [facility] if she has concerns overnight. Reviewed warning signs to report.   Sue Sotomayor CNM

## 2022-01-04 ENCOUNTER — PRENATAL OFFICE VISIT (OUTPATIENT)
Dept: OBGYN | Facility: CLINIC | Age: 37
End: 2022-01-04
Payer: COMMERCIAL

## 2022-01-04 ENCOUNTER — HOSPITAL ENCOUNTER (OUTPATIENT)
Facility: CLINIC | Age: 37
End: 2022-01-04
Payer: COMMERCIAL

## 2022-01-04 VITALS — DIASTOLIC BLOOD PRESSURE: 76 MMHG | BODY MASS INDEX: 36.86 KG/M2 | WEIGHT: 256.9 LBS | SYSTOLIC BLOOD PRESSURE: 106 MMHG

## 2022-01-04 DIAGNOSIS — Z34.93 PRENATAL CARE IN THIRD TRIMESTER: Primary | ICD-10-CM

## 2022-01-04 DIAGNOSIS — O09.93 HIGH-RISK PREGNANCY IN THIRD TRIMESTER: ICD-10-CM

## 2022-01-04 PROCEDURE — 99207 PR PRENATAL VISIT: CPT | Performed by: ADVANCED PRACTICE MIDWIFE

## 2022-01-04 NOTE — PROGRESS NOTES
S: Feels well. No concerns since falling on the stairs and landing on her knee.  Baby active.  Denies uterine cramping, vaginal bleeding or leaking of fluid. No headache, increase in edema, no epigastric pain. Registered for the hospital. Mother in law will watch son during the labor.   O: Vitals: LMP 04/22/2021     BMI= Body mass index is 36.86 kg/m .  Exam:  Constitutional: healthy, alert and no distress  Respiratory: respirations even and unlabored  Gastrointestinal: Abdomen soft, non-tender. Fundus measures appropriate for gestational age. Fetal heart tones hear without difficulty and within normal limits  : Deferred  Psychiatric: mentation appears normal and affect normal/bright  A:     ICD-10-CM    1. Prenatal care in third trimester  Z34.93    2. High-risk pregnancy in third trimester  O09.93    3. BMI 36.0-36.9,adult  Z68.36      P: Labor signs and symptoms discussed, aware of numbers to call  Discussed warning signs of PIH/preeclampsia and patient will monitor.  GBS screen completed. Discussed plans for labor. She is open to epidural.   Encouraged patient to call with any questions or concerns.  Return to clinic 1 week.  Sue Sotomayor CNM

## 2022-01-04 NOTE — NURSING NOTE
"Chief Complaint   Patient presents with     Prenatal Care       Initial /76 (BP Location: Left arm, Cuff Size: Adult Regular)   Wt 116.5 kg (256 lb 14.4 oz)   LMP 2021   Breastfeeding No   BMI 36.86 kg/m   Estimated body mass index is 36.86 kg/m  as calculated from the following:    Height as of 21: 1.778 m (5' 10\").    Weight as of this encounter: 116.5 kg (256 lb 14.4 oz).  BP completed using cuff size: regular    Questioned patient about current smoking habits.  Pt. has never smoked.          "

## 2022-01-04 NOTE — PATIENT INSTRUCTIONS
"[facility] Nurse Midwives - Contact information:  Appointment line and to get a hold of CNM in clinic Monday-Friday 8 am - 5 pm:  [phone].  There are some clinics with early start times (1st appointment 7:40 am) and others with evening hours (last appointment 6:20 pm).  Most are typically open from 8 am to 5 pm.    CNM on call answering service: [phone].  Specify your hospital of choice and leave a brief message for CNM;  will then page CNM who is on call at your specified hospital and you should receive a call back with 15 minutes.  Be sure that your ringer is audible and that you can accept blocked calls so that we can get back in touch with you! This number should be reserved for urgent needs if during the day, before 8 am, after 5 pm, weekends, holidays.    Contact the on-call CNM with warning signs, such as:    vaginal bleeding     Vaginal discharge and itching or pain and burning during urination    Leg/calf pain or swelling on one side    severe abdominal pain    nausea and vomiting more than 4-5 times a day, or if you are unable to keep anything down    fever more than 100.4 degrees F.       Meet the Midwives from [facility]  You are invited to an informational meet and greet with [facility] Certified Nurse-Midwives. Our free \"Meet the Midwives\" event is a great opportunity to learn about our midwives' philosophy and experience, the hospitals where we can assist with your birth, and answer questions you may have. Partners, friends, and family are welcome to attend. Currently, this is a virtual event.  Date  First Tuesday of every month at 7 pm.    Link to next (live) meeting  [email or web address]  To Join by Telephone (audio only) Call:   [phone] Phone Conference ID: [id number]#        Touring the Maternity Care Center  At this time we are offering a virtual tour of the " Maternity Care Centers at both [facility] and [facility]:   [facility]: [email or web address]  [facility]:   [email or web address]  [email or web address]/[facility]/Maternity-Care-Center/#[email or web address]_tour  When in person tours become available, registrations is required. To schedule a tour at either [facility] or [facility], please do so online using the following links:  [facility] - [email or web address]  [facility] - [email or web address]      Car Seat Clinics:  https://dps.[place].gov/divisions/ots/child-passenger-safety/Pages/car-seat-checks.aspx  [facility]    Free Car Seat Distribution Facilities     By Appt. Address Contact Information (For appointment)      \Yes [employer]\[address]\[place],\cell Anne-Marie Ayala)\[email or web address]      Yes [facility]\ [facility]\[place],\cell Ghislaine Canchola)[email or web address]      Yes [facility]\[address]\[place],\[place] Alda Alfredo)[phone]\[email or web address]      Immunizations:  http://www.cdc.gov/vaccines/schedules/easy-to-read/child.html      Postpartum Depression  The first weeks of caring for a  baby are more than a full-time job. Although it is often a happy time, your feelings and moods may not be what you expected. Many women experience  baby blues.  Here are some tips to help you understand when feelings of sadness are normal, and when you should call your health care provider.    What are the baby blues?  As many as 3 of every 4 women will have short periods of mood swings, crying, or feeling cranky or restless during the  first weeks after birth. These feelings can be worse when you are tired or anxious. Women who have the baby blues often say they feel like crying but don t know why. Baby blues usually happen in the first or second week postpartum (after you give birth) and last less than a week. If you are not sleeping, becoming more upset, don t feel like you can take care of your baby, or your sadness lasts 2 weeks or more, call your health care provider.    What is postpartum depression?  About one in every 5 women will develop depression during the first few months postpartum that may be mild, moderate, or severe. Women who have postpartum depression may have some of these symptoms:    Feeling guilty     Not able to enjoy your baby and feeling like you are not bonding with your baby      Not able to sleep, even when the baby is sleeping    Sleeping too much and feeling too tired to get out of bed    Feeling overwhelmed and not able to do what you need to during the day    Not able to concentrate    Don t feel like eating    Feeling like you are not normal or not yourself anymore    Not able to make decisions    Feeling like a failure as a mother    Feeling lonely or all alone    Thinking your baby might be better off without you  If you have any of these symptoms, call your health care provider!    Which symptoms of postpartum depression are dangerous?  Sometimes a woman with postpartum depression will have thoughts of harming herself or her baby. If you find yourself thinking about hurting yourself or your baby, call your health care provider immediately.    MOTHERHOOD: THE EARLY DAYS  You prepare for the birth of your baby for many months during pregnancy, and then the first months at home after your baby is born can be a quiet, gentle time of getting to know this new person who has come to live in your home. But for most women it is not all quiet or sweet. And for some women it is a very hard time.  What Can I Expect in the  First Few Months After the Baby Comes?  New mothers and their families face many challenges in the first few months:    Your body and your hormones have to get back to normal.    You and the baby will be learning to breastfeed.    Babies only sleep a few hours at a time. The entire family will have a hard time getting enough sleep.    You and your family need to learn how to parent this new family member.    If you have a partner, you have to figure out how to stay together as a couple and maybe even start to have sex again.    You may have to figure out how to keep from getting pregnant again right away.    You may need to return to work and find day care.    How Long Will it Take for My Body to Get Back to Normal?  Some changes will occur quickly. Others will not occur as quickly.    Your uterus, cervix, and vagina will all shrink to their nonpregnant size in about 2 weeks. Your vagina may be tender and dry for a few months--especially if you are breastfeeding.    If you had stitches or hemorrhoids, your   bottom   will be sore for 2 weeks or more.    For some women who have problems urinating, it can take several months for you to be able to hold your urine when you cough or sneeze or suddenly  something heavy.    Your breast milk will   come in   2 to 3 days after the birth of your baby. It will take 6 to 8 weeks for you and the baby to get the hang of breastfeeding and find a pattern. During these first weeks, you can have engorged breasts at times and often leak milk.    Your stomach and intestines all have to fall back into place. You may have a lot of gas for a few weeks.    You may be constipated--especially if you are breastfeeding.    Your stretched stomach muscles can recover in a few weeks, but for some women it takes longer--6 months or a year--to recover.    If you had a  delivery, you may have pain or numbness around the incision for 6 months or more.    Losing the weight you gained  during pregnancy will probably take 6 months to a year. Have patience! It took 40 weeks to get here. Give yourself 40 weeks to get back.    What Can I Expect When My Hormones Change?  About 75% of all women will get the   blues.   This usually starts about 3 days after the birth of your baby. You may cry easily and feel very, very tired. A few women become very depressed. If you had a  delivery or your new baby was sick, you are at a higher risk for depression.  Call your health care provider right away if you cannot care for yourself or your baby, if you feel very nervous or worried, if you cannot stop crying, or if you are having thoughts of hurting yourself or your baby.    Taking Care of Yourself  While you are still pregnant:    Talk with your partner and your family about the time ahead. Arrange for someone to help you during the first weeks at home if you can.    Talk with your health care provider about birth control options and make a plan before the baby comes.    If you are worried about how to parent a , take parenting classes. You will learn a lot about how babies act and you will make some friends who are going through the same thing at the same time. Most [facility] have these classes.    Arrange for someone to help with baby care if you can.  After the baby comes:    Ask for help. Let other people do the cooking and cleaning and run the house. Focus on yourself and your baby.    Sleep whenever you can. Try not to be tempted to   get some things done   when the baby sleeps. This is your time to sleep, too.    Drink lots of water. You will need at least 6 big glasses of water everyday to avoid constipation and make enough breast milk. Every time you sit down to breastfeed, have a big glass of water with you to drink while you are nursing.    Eat lots of vegetables and fruit. You will need lots of vitamins and fiber to help your body get back to normal. This will also help you avoid  constipation.    Go outside and walk. Babies can go outside even if it is very cold. Fresh air and sunshine will do you both good.    Take sitz baths. Put about 6 inches of warm water in your bathtub and sit in there for 15 minutes 2 to 3 times a day. This will help your   bottom   heal more quickly. It will also give you 15 minutes of private time!    Talk to other mothers. Join a new parents group. Call April and go to [facility] meetings if you are breastfeeding.     With your partner:    Keep talking. Share the experience.    Spend time alone. Even a 30-minute walk can be a date.    Start a birth control method. You can get pregnant before you even have a period. It is very important to use birth control if you do not want to get pregnant again right away.    When you have sex, use a lubricant. A lot of lubricant! Take it slow.  The first few months after a baby comes can be a lot like floating in a jar of honey--very sweet and alonso, but very sticky, too. Take time to enjoy the good parts. Remind yourself that this time will pass. Bon voyage!    FOR MORE INFORMATION  For questions about depression during and after pregnancy:  http://www.womenshealth.gov/publications/our-publications/fact-sheet/depression-pregnancy.html   After birth: The first 6 weeks:  http://www.[employer]/Post-Birth-and-Recovery   Breastfeeding resources:  http://www.Wanel[employer]GotVo[employer]elves.org/health-info/getting-breastfeeding-off-to-a-good-start/    HEALTHY PREGNANCY CARE: 37 to 41 WEEKS PREGNANT    Talk with your midwife or physician about when to call with signs of labor    Regular uterine contractions that are getting closer together and/or stronger    If you think your water has broken or is leaking    Bleeding from the vagina like a period (bloody vaginal discharge is normal)    If you are not feeling your baby move    Make plans for transportation and  as needed for when you are going to the hospital.    Your  midwife or physician may offer to check your cervix for changes.     Ask your health care provider about vaccinations you may need following delivery. By now, you should have received a Tdap immunization to protect against pertussis or whooping cough. Fathers and family members who will be in close contact with the baby should also receive a Tdap shot at least two weeks before the expected birth of the baby if they have not had a Td (tetanus) shot for at least two years.    If you are past your due date, discuss the next steps leading to delivery with your midwife or physician. If you don't start labor on your own by 41 or 42 weeks, your midwife or physician may recommend giving you medicines to ripen your cervix and start labor.    Preparing for your baby: Tell your midwife or physician how you plan to feed your baby (breast or bottle), who you have chosen to do pediatric care for your baby, and if you have a boy, whether you have chosen to have him circumcised. You will need a car seat correctly installed in your vehicle to bring your baby home. As you start to set up the nursery at home for your baby, make sure the crib is safe. The mattress needs to fit snugly against the edges of the crib. If you can fit a soda can between the bars, they are too far apart and can allow the baby's head to caught between them.    Learn about infant care and feeding, including information about infant CPR. We recommend that you put your baby to sleep on his or her back to reduce the chance of Sudden Infant Death Syndrome (SIDS). To maintain a healthy environment in which your child can grow, it's best to keep your home smoke-free. By preparing ahead, your transition into parenthood will go smoothly for you and your baby.    Your midwife or physician will want to see you for a checkup 2 to 6 weeks after delivery.        How can you care for yourself at home?   You can refer to the Starting Out Right book or find it online at  http://www.[facility]ast.org/images/stories/maternity/[facility]-Starting-Out-Right.pdf or http://www.[facility]theast.org/images/stories/flipbooks/[facility]-starting-out-right/[facility]-starting-out-right.html#p=8     You can sign up for a weekly parenting e-mail that gives support, tips and advice from health care professionals that starts with pregnancy and continues through the toddler years. To register, go to [email or web address]/baby at any time during your pregnancy.        Making Plans for Feeding My Baby    By this point, you probably have read a lot about feeding your baby.  Breastfeed or formula? Each mother s decision is her own and [facility] respects you and your choices. We ve gathered information on both breastfeeding and formula feeding to help with your decision. Talking with your physician or nurse-midwife can also help in your decision.  However you plan to feed your baby, [facility] encourage rooming in with your baby, skin-to-skin contact and feeding your baby based on his or her cues.    Skin-to-skin contact  Being close to mom helps your baby adjust to life outside of the womb.  It helps your baby regulate their body temperature, heart rate, and breathing.  Your baby will usually be placed skin-to-skin immediately following birth or as soon as possible, if medical intervention is needed.    Rooming-In  Having your baby stay with you in your room is called  rooming-in .  Keeping your baby in your room helps you to learn how to care for your baby by getting to know your baby s cues, body rhythms and sleep cycle.       Cue-based feeding  Cues (signals) are baby s way of telling you what he or she wants.  When you learn your infant s cues, you know how to care for and feed your baby.   Feeding cues are the licking and smacking of lips, bringing their fist to their mouth, and a reflex called  rooting - where baby turns and opens his or her mouth, searching for the breast or bottle.   Crying is a late feeding cue.  Babies can feed frequently, often at least 8 times in 24 hours.  Breastfeeding facts  Breast milk is the best source of nutrition for your baby and is available at birth. In the first couple of days, your milk volume is already starting to increase, though it may not be noticeable. Breastfeed frequently to increase your milk supply. Within three to five days, you will begin to notice larger milk volumes. An increase in breast size, heaviness and firmness are often described as the milk  coming in.  Frequent breastfeeding can help breasts from getting overly firm and painful. You will know the baby is getting enough milk if your baby is having wet and dirty diapers and gaining weight.     If your goal is to exclusively breastfeed, it is important to not use any formula or artificial nipples (including bottles and pacifiers) while your baby is learning to breastfeed.  While it may seem like an  easy  option to give your baby a bottle, formula should only be given if there is a medical reason for your baby to have it.    Positioning and attachment   Get comfortable.  Use pillows as needed to support your arms and baby.  Hold baby close at the level of your breast, facing you in a tummy to tummy position.  Skin to skin helps with this.  Position the baby with his or her nose by the nipple.  There should be a straight line from baby s ear to shoulder to hips.  Tickle your baby s lips or wait for baby to open mouth wide, bring baby to breast by leading with the chin.  Aim the nipple at the roof of baby s mouth.  A rapid sucking pattern is followed by longer, drawing pattern with occasional swallows heard.  When baby is correctly latched, your nipple and much of the areola are pulled well into baby s mouth.      Returning to work or school  Focus on a good start to breastfeeding.  Many women continue to provide breastmilk for their baby when they return to work or school.  Making plans about  where to pump and store milk can make the transition go well.  Talk with other mothers who have also returned to work or school for tips and support.  Your employer s Human Resource department may be a resource as well.     Returning to work or school: (continued)     babies can mean fewer  sick  days for you.    A quality breast pump will also save time and add comfort.  Check with your insurance prior to giving birth for breast pump coverage.  Many insurance companies include a pump within your benefits.    Wait until your baby is at least three weeks old to introduce a bottle for the first time.  Have someone besides you give the bottle.    Breastfeed when you are with your baby. Reserve your bottles of breast milk for when you are away.     Your breasts will need to be  emptied  either by your baby or a pump.  Plan to pump at least twice in an eight hour day.    If you cannot pump at work, continue breastfeeding at home. Any amount of breast milk is worth giving to your baby.    Formula feeding facts  If you are planning to use formula to feed your baby, you will want to make some preparations ahead of time. Talk to your doctor or nurse-midwife about what type of formula to use. Some are iron-fortified, meaning they have extra iron in them. You will want to purchase formula and bottles before your baby is born to be sure you are ready after you return from the hospital. [facility] do not provide formula samples to take home.    Be sure to follow formula mixing directions closely. Regular milk in the dairy case at the grocery store should not be given to babies under 1 year old. Baby formula is sold in several forms including:    Ready-to-use. This is the most expensive, but no mixing is necessary.    Concentrated liquid. This is less expensive than ready-to-use and you mix with water.    Powder. This is the least expensive. You mix one level scoop of powdered formula with two ounces of  water and stir well.    Most babies need 2.5 ounces of formula per pound of body weight each day. This means an 8-pound baby may drink about 20 ounces of formula a day; however, this is just an estimate. The most important thing is to pay attention to your baby s cues.  If your baby is always fussy, needs more iron or has certain food allergies, your physician may suggest you change your baby s formula to a different kind.     How do I warm my baby s bottles?  You may feed your baby a bottle without warming it first. It is OK for the breast milk or formula to be cool or room temperature. If your baby seems to prefer it warmed, you can put the filled bottle in a container of warm water and let it stand for a few minutes. Check the temperature of the liquid on your skin before feeding it to your baby; to be sure it isn t too hot. Do not heat bottles in the microwave. Microwaves heat food and liquids unevenly, and this can cause hot spots that can burn your baby.    How do I clean and sterilize bottles?  Sterilize bottles and nipples before you use them for the first time. You can do this by putting them in boiling water for 5 minutes. After that first time, you can wash them in hot and soapy water. Rinse them carefully to be sure there is no soap left on them. You can also wash them in the .    [facility] [phone] ([phone])    Share with Women\Tiehsa I in Labor?  What is labor? Labor is the work that your body does to birth your baby. Your uterus (the womb) contracts(tightens). The contractions(labor pains) push your baby down onto your cervix(the opening ofyour uterus). Thispressure causesyour cervix to open. When your cervix iscompletely open (10 centimeters dilated), you will push your baby through your vagina and out into the world.  What do contractions feel like? When contractionsfirst start, theyusually feellike cramps duringyour period. Sometimesyoufeelpain in your back. Mostoften,contractions  feel like muscles pulling painfully in your lower belly. At first, the contractions will probably be 15 to 20 minutes apart.They maybe irregular and will not feel too painful. As labor goes on, the contractionsget stronger,closer together, more consistent, and more painful.  How do I time the contractions? When the contractionsseem to be coming regularly, youshould start to time them.You time your contractions by counting the number of minutes from the start of one contraction to the start of the next contraction.  What should I do during early labor when the contractions start? If it is night andyoucan sleep, do so. If it happensduring the day, there are some things you can do to take care of yourself at home: Walk. If the painsyou are having are reallabor, walking will makethecontractionscome closer together and they will be stronger,but you will be able to cope with them better if you are standing or moving around. If the contractions are early labor ones that come andgo (sometimes called false labor), walkingcan make them go away. Take a shower or bath. This will help you relax. Eat. Labor is a big event.Your body needs a lot of energy to be effective.Eat whatever you feel like eating. Drink water. Not drinking enough water can cause contractions to not be as effectiveas theyshould be.You need to be well hydrated (drinking enoughwater) to help your body work well during labor. Take a na p. If youfeel tired, lay down on your side and get all the rest you can. It helps to be rested when you go intoactive labor. Do something you enjoy. Spend time with family. Watch a movie. Distraction will help you relax. Get a massage. If your labor is in your back, a strong massage on your lower back may feel very good. Getting a foot massage or having a partner rub your feet can also be very relaxing. Don t panic. You can do this. Your body was made for this. You are strong!  When should I call my health care provider if I  think I am in labor? Your contractions have been 5 minutes or less apart for at least an hour. Your contractions are becoming so painful youcannot walk or talk during one. You think your amniotic sac (bag of evans) breaks. You may have a big gush of amniotic fluid (water) or just fluid that runs down your legs when you walk or move or change position.  Are there other reasons to call my health care provider? If you are concerned about anything, don t hesitate to call your health care provider.You should definitely call your health care provider or go to the hospital if:  It is 3 weeks or more before your due date, and you are having contractions.  You have vaginal bleeding that is more than your period, soaks your underwear, or runs down your legs.  You have sudden severe pain that does not go away with rest.  Your baby has not movedfor several hours.  You are leaking greenish fluid.    For More Information: http://onlinelibrary.hickey.com/doi/10.1111/jmwh.94646/epdf     US Department of Health and Human Services: Signs oflabor,labor stages, and types of birth  http://womenshealth.gov/pregnancy/childbirth-beyond/labor-birth.html#a      Childbirth and Parenting Education:         Everyday Miracles:   https://www.everyday-miracles.org/    Free Video Series from [facility]: https://nursing.[facility]/academics/specialty-areas/nurse-midwifery/having-baby-prenatal-videos/having-baby-prenatal-and    DIAMOND parenting center: http://[facility].bigclix.com/   (811) 037-BABY  Blo[place]: (education, yoga & wellness) www.Dom[employer] Magazinea.bigclix.com  Enlightened Mama: www.enlightenedmama.com   Childbirth collective: (Parent topic nights)  www.childbirthcollective.org/  Hypnobabies:  www.hypnobabiestSaleStreamcities.com/  Hypnobirthing:  Http://hypnobirthing.com/  The Birth Hour: https://[facility].bigclix.com/online-childbirth-class/    APPS and Podcasts:   Ashley Roberts Nurture    Evidence Based Birth  The Birth Hour (for birth stories)   Birthful    Expectful   The Longest Shortest Time  PregnancyPodcast Areli Turk    Book Recommendations:   Sherrie Jelena's Birthing From Within--first few chapters include a new-age tone, you may prefer to skip it and keep going, because there is good stuff later.  This book recommendation covers emotional preparation, but does cover coping with pain, and use of both pharmacological and nonpharmacological methods.    Dr. Byers' The Pregnancy Book and The Birth Book--the pregnancy book goes month-by month      The Birth Partner by Blanche Lozada    Womanly Art of Breastfeeding by La Leche League International   Bestfeeding by Herlinda Denis--great pictures    Mothering Your Nursing Toddler, by Loli Fields.   Addresses dealing with so many of the challenging behaviors of a nursing toddler.  How Weaning Happens, by La Leche League.  Discusses weaning at all ages, from medically necessary weaning of an infant, all the way up to age 5 (or older), with why/why not, and strategies.  Very empowering book both for deciding to wean and deciding not to.    American College of Nurse-Midwives (ACNM) http://www.midwife.org/; look at the informational handouts at http://www.midwife.org/Share-With-Women     www.mymidwife.org    Mother to Baby (Medication and Herbal guidance in pregnancy): http://www.mothertobaby.org  Toll-Free Hotline: 731.527.1606  LactMed (Medication use while breastfeeding): http://toxnet.nlm.nih.gov/newtoxnet/lactmed.htm    Women's Health.gov:  http://www.womenshealth.gov/a-z-topics/index.html    American pregnancy association - http://americanpregnancy.org    Centering Pregnancy (group prenatal care option): http://centeringhealthcare.org    Information about doulas:  Childbirth collective: http://www.childbirthcollective.org/  Doulas of [place] (LIZET):  www.lizet.org  Affordit.com [facility]  project: http://Brill Street + Companycitiesdoulaproject.com/     Early Childhood and Family Education (ECFE):  ECFE offers parents hands-on  "learning experiences that will nourish a lifetime of teachable moments.  http://ecfe.info/ecfe-home/    March of Dimes www.[employer] Systems.Quanergy Systems     FDA - Nutrition  www.mypyramid.gov  Under \"For Consumers,\" click on \"pregnant and breastfeeding women.\"      Centers for Disease Control and Prevention (CDC) - Vaccines : http://www.cdc.gov/vaccines/       When researching information on the web, question the validity of websites.  The [employer] .gov, LinguaSys and.org tend to be more reliable information.  If there are a lot of advertisements, be cautious of the information provided. Stay away from blogs and chat rooms please!       Virtual Breastfeeding Support:    During this time of isolation, breastfeeding families need even more community!  Here are some area organizations offering virtual support groups for breastfeeding:      [employer] Support Group, Tuesdays at 10:30 am   Run by KORY Phelan of [facility]   Go to [facility] Facebook page and click on \"events\" for link   https://www.Performable.com/events/053267275029552/    [facility] Milk Hour, Thursdays at 2:30 pm    Run by KORY Jones   Go to [facility] FB page and send message to get link   https://www.Performable.com/healthfoundations/    Arjun Gomes [facility] holding virtual meetings the first Tuesday of each month, 8-9 pm, and the   Third Saturday, 10 - 11 am.  Go to La Leche League [facility] [place] FB page; message to get link https://www.Performable.com/LLLofGoldenValneel/?hc_location=[facility]    Rachel offers a Lactation Lounge every Friday 12pm - 1pm, run by Arjun Whittington Leader   Sign up via link at [employer]/cbe-lactation   https://www.[employer]/cbe-lactation    [facility] is offering virtual support groups every Monday, 10:30 am - 12 pm, run by nurse " IBCLC   Https://www.agri.capital.com/events/543281593285476/    Prenatal Breastfeeding Classes:        [employer] is offering virtual breastfeeding and  care classes:  https://www.Networked Insights/education-workshops    BirthEd childbirth and breastfeeding education offering virtual prenatal breastfeeding classes  https://www."[employer]"mn.Gruppo MutuiOnline/workshops

## 2022-01-13 ENCOUNTER — PRENATAL OFFICE VISIT (OUTPATIENT)
Dept: OBGYN | Facility: CLINIC | Age: 37
End: 2022-01-13
Payer: COMMERCIAL

## 2022-01-13 VITALS — DIASTOLIC BLOOD PRESSURE: 76 MMHG | SYSTOLIC BLOOD PRESSURE: 118 MMHG | BODY MASS INDEX: 37.16 KG/M2 | WEIGHT: 259 LBS

## 2022-01-13 DIAGNOSIS — O09.93 HIGH-RISK PREGNANCY IN THIRD TRIMESTER: Primary | ICD-10-CM

## 2022-01-13 PROCEDURE — 99207 PR PRENATAL VISIT: CPT | Performed by: ADVANCED PRACTICE MIDWIFE

## 2022-01-13 NOTE — PROGRESS NOTES
S: Feels well,  Baby active.  Denies uterine cramping, vaginal bleeding or leaking of fluid. No headache, increase in edema, no epigastric pain.  Has some pelvic pressure that she notices when she is changing positions.  Sleeping well.   O: Vitals: LMP 04/22/2021   BMI= Body mass index is 37.16 kg/m .  Exam:  Constitutional: healthy, alert and no distress  Respiratory: respirations even and unlabored  Gastrointestinal: Abdomen soft, non-tender. Fundus measures appropriate for gestational age. Fetal heart tones hear without difficulty and within normal limits  : Deferred  Psychiatric: mentation appears normal and affect normal/bright  A:     ICD-10-CM    1. High-risk pregnancy in third trimester  O09.93    2. BMI 36.0-36.9,adult  Z68.36      P: Labor signs and symptoms discussed, aware of numbers to call  Discussed warning signs of PIH/preeclampsia and patient will monitor.  GBS screen and negative. Hgb is 12.3   Discussed plans for labor.   Encouraged patient to call with any questions or concerns.  Return to clinic 1 weeks  Sue Sotomayor CNM

## 2022-01-20 ENCOUNTER — PRENATAL OFFICE VISIT (OUTPATIENT)
Dept: OBGYN | Facility: CLINIC | Age: 37
End: 2022-01-20
Payer: COMMERCIAL

## 2022-01-20 VITALS — DIASTOLIC BLOOD PRESSURE: 78 MMHG | BODY MASS INDEX: 37.61 KG/M2 | WEIGHT: 262.1 LBS | SYSTOLIC BLOOD PRESSURE: 110 MMHG

## 2022-01-20 DIAGNOSIS — O09.93 HIGH-RISK PREGNANCY IN THIRD TRIMESTER: Primary | ICD-10-CM

## 2022-01-20 PROCEDURE — 99207 PR PRENATAL VISIT: CPT | Performed by: ADVANCED PRACTICE MIDWIFE

## 2022-01-20 NOTE — PROGRESS NOTES
S: Feels good and ready for labor,  Baby active.  Denies uterine cramping but does feel her belly get firm sometimes. Denies vaginal bleeding or leaking of fluid outside of normal vaginal discharge. No headache, increase in edema, no epigastric pain.     O: Vitals: /78 (BP Location: Left arm, Cuff Size: Adult Regular)   Wt 118.9 kg (262 lb 1.6 oz)   LMP 04/22/2021   Breastfeeding No   BMI 37.61 kg/m    BMI= Body mass index is 37.61 kg/m .  Exam:  Constitutional: healthy, alert and no distress  Respiratory: respirations even and unlabored  Gastrointestinal: Abdomen soft, non-tender. Fundus measures appropriate for gestational age. Fetal heart tones hear without difficulty and within normal limits  : Deferred  Psychiatric: mentation appears normal and affect normal/bright  A:     ICD-10-CM    1. High-risk pregnancy in third trimester  O09.93      P: Labor signs and symptoms discussed, aware of numbers to call.  Discussed warning signs of PIH/preeclampsia and patient will monitor.  GBS screen completed. Discussed plans for labor. Discussed patient options for postpartum contraception. Patient is planning oral contraceptives- mini pill.   Discussed postdates options and she will think about what she would want to do.   Encouraged patient to call with any questions or concerns.  Return to clinic 1 weeks    PARISH Chery    I was present with the student who participated in the service and documentation of the services provided. I have verified the history and personally performed the physical exam and medical decision making as documented by the student and edited by me.  ALMA Roberson, BEVERLY 1/20/2022 2:19 PM

## 2022-01-20 NOTE — PATIENT INSTRUCTIONS
"Labor Instructions for Midwife Patients    When to call:  Both during and after office hours call [phone]. There is a Nurse Midwife available to take your calls and answer your questions 24 hours a day.     When to call:  Call anytime you have important concerns about you or your baby.     Call if:    You are having contractions at regular intervals about 5-6 minutes apart lasting 30-60 seconds and becoming increasingly more intense     You have an uncontrollable gush of fluid from your vagina or feel a pop and gush like your water has broken    You have HEAVY bleeding, like heavy period, blood running down your legs, or  soaking a pad.     Some bleeding after a pelvic exam, after intercourse, or in labor when your cervix is dilating is normal and is referred to as \"bloody show\"    You have severe, continuous back or abdominal pain    You feel it is time to go to the hospital    If this is your first labor, call when contractions are very intense and have been about every 3-4 minutes for about an hour    If it is your second labor or more, call when contractions are strong and about every 3-5 minutes or sooner depending on your level of discomfort.     Keep in mind we are always here for you! If you have questions, concerns please don't hesitate to call us.     What to eat/drink in labor: Drink plenty of fluid (water most importantly, juice, soda or tea without caffeine). Eat rice, pasta, soup, cereal, bread/toast, and fruit. Avoid dairy and greasy food as they are difficult to digest and you may experience some nausea during labor.    Comfort measures:    Baths and showers (ok even with ruptured membranes, it may temporarily slow contractions if you are still in the early stage of labor)    Warm/hot packs for back pain or discomfort    Back, belly, or thigh massages    Standing, rocking, walking, leaning over bed or tables, side-lying and sleeping    Miscellaneous:     Contractions are timed from the beginning " of one to the beginning of the next    Try hard to sleep during the early stage of labor when you are not that uncomfortable. Timing of contractions at this point is not important    Even if you cannot sleep, resting in bed or on the couch can help you maintain your energy for labor    When you arrive at the hospital the nurse will check your baby's heartbeat, check your cervix, and will call us. The midwife on call will come in and be with you when you are in active labor    After hours you need to enter the hospital through the emergency room       Post Dates Management    If you've gone beyond your due date you are probably thinking when is this baby coming!  Most babies are born on or after their due date so it is nothing to worry about.    If you are pregnant at 41 weeks we will start some increased monitoring to make sure that your baby and the placenta are healthy enough to continue your pregnancy.      We would have you come to the clinic every 3-4 days to be assessed with an ultrasound called a Biophysical profile (BPP).       This tells us how your baby is doing. We monitor fetal movement, breathing patterns, amniotic fluid level, and heart rate.     Research has shown that by 42 weeks gestation the placenta is not always healthy enough to support the pregnancy further and it is better for the baby to be born.  If you are still pregnant by 41 and a half weeks we will discuss induction of labor with you and the different options available.

## 2022-01-20 NOTE — NURSING NOTE
"Chief Complaint   Patient presents with     Prenatal Care     39w 0d       Initial /78 (BP Location: Left arm, Cuff Size: Adult Regular)   Wt 118.9 kg (262 lb 1.6 oz)   LMP 2021   Breastfeeding No   BMI 37.61 kg/m   Estimated body mass index is 37.61 kg/m  as calculated from the following:    Height as of 21: 1.778 m (5' 10\").    Weight as of this encounter: 118.9 kg (262 lb 1.6 oz).  BP completed using cuff size: regular    Questioned patient about current smoking habits.  Pt. has never smoked.          "

## 2022-01-23 ENCOUNTER — ANESTHESIA EVENT (OUTPATIENT)
Dept: OBGYN | Facility: CLINIC | Age: 37
End: 2022-01-23
Payer: COMMERCIAL

## 2022-01-23 ENCOUNTER — ANESTHESIA (OUTPATIENT)
Dept: OBGYN | Facility: CLINIC | Age: 37
End: 2022-01-23
Payer: COMMERCIAL

## 2022-01-23 ENCOUNTER — NURSE TRIAGE (OUTPATIENT)
Dept: NURSING | Facility: CLINIC | Age: 37
End: 2022-01-23
Payer: COMMERCIAL

## 2022-01-23 ENCOUNTER — HOSPITAL ENCOUNTER (INPATIENT)
Facility: CLINIC | Age: 37
LOS: 2 days | Discharge: HOME | End: 2022-01-25
Attending: ADVANCED PRACTICE MIDWIFE | Admitting: ADVANCED PRACTICE MIDWIFE
Payer: COMMERCIAL

## 2022-01-23 LAB
ABO/RH(D): NORMAL
ANTIBODY SCREEN: NEGATIVE
SARS-COV-2 RNA RESP QL NAA+PROBE: NEGATIVE
SPECIMEN EXPIRATION DATE: NORMAL

## 2022-01-23 PROCEDURE — G0463 HOSPITAL OUTPT CLINIC VISIT: HCPCS

## 2022-01-23 PROCEDURE — 250N000013 HC RX MED GY IP 250 OP 250 PS 637: Performed by: ADVANCED PRACTICE MIDWIFE

## 2022-01-23 PROCEDURE — 250N000009 HC RX 250: Performed by: ADVANCED PRACTICE MIDWIFE

## 2022-01-23 PROCEDURE — 250N000011 HC RX IP 250 OP 636: Performed by: ADVANCED PRACTICE MIDWIFE

## 2022-01-23 PROCEDURE — 0KQM0ZZ REPAIR PERINEUM MUSCLE, OPEN APPROACH: ICD-10-PCS | Performed by: ADVANCED PRACTICE MIDWIFE

## 2022-01-23 PROCEDURE — 00HU33Z INSERTION OF INFUSION DEVICE INTO SPINAL CANAL, PERCUTANEOUS APPROACH: ICD-10-PCS | Performed by: ANESTHESIOLOGY

## 2022-01-23 PROCEDURE — 3E0R3BZ INTRODUCTION OF ANESTHETIC AGENT INTO SPINAL CANAL, PERCUTANEOUS APPROACH: ICD-10-PCS | Performed by: ANESTHESIOLOGY

## 2022-01-23 PROCEDURE — 370N000003 HC ANESTHESIA WARD SERVICE

## 2022-01-23 PROCEDURE — 59400 OBSTETRICAL CARE: CPT | Performed by: ADVANCED PRACTICE MIDWIFE

## 2022-01-23 PROCEDURE — 722N000001 HC LABOR CARE VAGINAL DELIVERY SINGLE

## 2022-01-23 PROCEDURE — 120N000001 HC R&B MED SURG/OB

## 2022-01-23 PROCEDURE — 87635 SARS-COV-2 COVID-19 AMP PRB: CPT | Performed by: ADVANCED PRACTICE MIDWIFE

## 2022-01-23 PROCEDURE — 250N000009 HC RX 250: Performed by: ANESTHESIOLOGY

## 2022-01-23 PROCEDURE — 250N000011 HC RX IP 250 OP 636: Performed by: ANESTHESIOLOGY

## 2022-01-23 PROCEDURE — 86901 BLOOD TYPING SEROLOGIC RH(D): CPT | Performed by: ADVANCED PRACTICE MIDWIFE

## 2022-01-23 PROCEDURE — 250N000011 HC RX IP 250 OP 636

## 2022-01-23 PROCEDURE — 86780 TREPONEMA PALLIDUM: CPT | Performed by: ADVANCED PRACTICE MIDWIFE

## 2022-01-23 PROCEDURE — 258N000003 HC RX IP 258 OP 636: Performed by: ADVANCED PRACTICE MIDWIFE

## 2022-01-23 RX ORDER — MISOPROSTOL 200 UG/1
400 TABLET ORAL
Status: DISCONTINUED | OUTPATIENT
Start: 2022-01-23 | End: 2022-01-23

## 2022-01-23 RX ORDER — NALOXONE HYDROCHLORIDE 0.4 MG/ML
0.2 INJECTION, SOLUTION INTRAMUSCULAR; INTRAVENOUS; SUBCUTANEOUS
Status: DISCONTINUED | OUTPATIENT
Start: 2022-01-23 | End: 2022-01-23

## 2022-01-23 RX ORDER — METOCLOPRAMIDE HYDROCHLORIDE 5 MG/ML
10 INJECTION INTRAMUSCULAR; INTRAVENOUS EVERY 6 HOURS PRN
Status: DISCONTINUED | OUTPATIENT
Start: 2022-01-23 | End: 2022-01-23 | Stop reason: HOSPADM

## 2022-01-23 RX ORDER — NALOXONE HYDROCHLORIDE 0.4 MG/ML
0.4 INJECTION, SOLUTION INTRAMUSCULAR; INTRAVENOUS; SUBCUTANEOUS
Status: DISCONTINUED | OUTPATIENT
Start: 2022-01-23 | End: 2022-01-23 | Stop reason: HOSPADM

## 2022-01-23 RX ORDER — CARBOPROST TROMETHAMINE 250 UG/ML
250 INJECTION, SOLUTION INTRAMUSCULAR
Status: DISCONTINUED | OUTPATIENT
Start: 2022-01-23 | End: 2022-01-23

## 2022-01-23 RX ORDER — OXYTOCIN/0.9 % SODIUM CHLORIDE 30/500 ML
100-340 PLASTIC BAG, INJECTION (ML) INTRAVENOUS CONTINUOUS PRN
Status: DISCONTINUED | OUTPATIENT
Start: 2022-01-23 | End: 2022-01-24

## 2022-01-23 RX ORDER — SODIUM CHLORIDE, SODIUM LACTATE, POTASSIUM CHLORIDE, CALCIUM CHLORIDE 600; 310; 30; 20 MG/100ML; MG/100ML; MG/100ML; MG/100ML
INJECTION, SOLUTION INTRAVENOUS CONTINUOUS
Status: DISCONTINUED | OUTPATIENT
Start: 2022-01-23 | End: 2022-01-23 | Stop reason: HOSPADM

## 2022-01-23 RX ORDER — METHYLERGONOVINE MALEATE 0.2 MG/ML
200 INJECTION INTRAVENOUS
Status: DISCONTINUED | OUTPATIENT
Start: 2022-01-23 | End: 2022-01-25 | Stop reason: HOSPADM

## 2022-01-23 RX ORDER — TRANEXAMIC ACID 10 MG/ML
1 INJECTION, SOLUTION INTRAVENOUS EVERY 30 MIN PRN
Status: DISCONTINUED | OUTPATIENT
Start: 2022-01-23 | End: 2022-01-23 | Stop reason: HOSPADM

## 2022-01-23 RX ORDER — METHYLERGONOVINE MALEATE 0.2 MG/ML
200 INJECTION INTRAVENOUS
Status: DISCONTINUED | OUTPATIENT
Start: 2022-01-23 | End: 2022-01-23

## 2022-01-23 RX ORDER — KETOROLAC TROMETHAMINE 30 MG/ML
30 INJECTION, SOLUTION INTRAMUSCULAR; INTRAVENOUS
Status: DISCONTINUED | OUTPATIENT
Start: 2022-01-23 | End: 2022-01-23

## 2022-01-23 RX ORDER — NALOXONE HYDROCHLORIDE 0.4 MG/ML
0.2 INJECTION, SOLUTION INTRAMUSCULAR; INTRAVENOUS; SUBCUTANEOUS
Status: DISCONTINUED | OUTPATIENT
Start: 2022-01-23 | End: 2022-01-23 | Stop reason: HOSPADM

## 2022-01-23 RX ORDER — PROCHLORPERAZINE MALEATE 10 MG
10 TABLET ORAL EVERY 6 HOURS PRN
Status: DISCONTINUED | OUTPATIENT
Start: 2022-01-23 | End: 2022-01-23

## 2022-01-23 RX ORDER — LIDOCAINE HYDROCHLORIDE AND EPINEPHRINE 15; 5 MG/ML; UG/ML
INJECTION, SOLUTION EPIDURAL PRN
Status: DISCONTINUED | OUTPATIENT
Start: 2022-01-23 | End: 2022-01-23

## 2022-01-23 RX ORDER — ONDANSETRON 2 MG/ML
4 INJECTION INTRAMUSCULAR; INTRAVENOUS EVERY 6 HOURS PRN
Status: DISCONTINUED | OUTPATIENT
Start: 2022-01-23 | End: 2022-01-23

## 2022-01-23 RX ORDER — DOCUSATE SODIUM 100 MG/1
100 CAPSULE, LIQUID FILLED ORAL DAILY
Status: DISCONTINUED | OUTPATIENT
Start: 2022-01-23 | End: 2022-01-25 | Stop reason: HOSPADM

## 2022-01-23 RX ORDER — METOCLOPRAMIDE HYDROCHLORIDE 5 MG/ML
10 INJECTION INTRAMUSCULAR; INTRAVENOUS EVERY 6 HOURS PRN
Status: DISCONTINUED | OUTPATIENT
Start: 2022-01-23 | End: 2022-01-23

## 2022-01-23 RX ORDER — OXYTOCIN 10 [USP'U]/ML
10 INJECTION, SOLUTION INTRAMUSCULAR; INTRAVENOUS
Status: DISCONTINUED | OUTPATIENT
Start: 2022-01-23 | End: 2022-01-23

## 2022-01-23 RX ORDER — MISOPROSTOL 200 UG/1
400 TABLET ORAL
Status: DISCONTINUED | OUTPATIENT
Start: 2022-01-23 | End: 2022-01-23 | Stop reason: HOSPADM

## 2022-01-23 RX ORDER — FENTANYL CITRATE-0.9 % NACL/PF 10 MCG/ML
100 PLASTIC BAG, INJECTION (ML) INTRAVENOUS EVERY 5 MIN PRN
Status: DISCONTINUED | OUTPATIENT
Start: 2022-01-23 | End: 2022-01-23 | Stop reason: HOSPADM

## 2022-01-23 RX ORDER — PROCHLORPERAZINE MALEATE 10 MG
10 TABLET ORAL EVERY 6 HOURS PRN
Status: DISCONTINUED | OUTPATIENT
Start: 2022-01-23 | End: 2022-01-23 | Stop reason: HOSPADM

## 2022-01-23 RX ORDER — METHYLERGONOVINE MALEATE 0.2 MG/ML
200 INJECTION INTRAVENOUS
Status: DISCONTINUED | OUTPATIENT
Start: 2022-01-23 | End: 2022-01-23 | Stop reason: HOSPADM

## 2022-01-23 RX ORDER — OXYTOCIN 10 [USP'U]/ML
10 INJECTION, SOLUTION INTRAMUSCULAR; INTRAVENOUS
Status: DISCONTINUED | OUTPATIENT
Start: 2022-01-23 | End: 2022-01-23 | Stop reason: HOSPADM

## 2022-01-23 RX ORDER — ACETAMINOPHEN 325 MG/1
650 TABLET ORAL EVERY 4 HOURS PRN
Status: DISCONTINUED | OUTPATIENT
Start: 2022-01-23 | End: 2022-01-25 | Stop reason: HOSPADM

## 2022-01-23 RX ORDER — OXYTOCIN/0.9 % SODIUM CHLORIDE 30/500 ML
340 PLASTIC BAG, INJECTION (ML) INTRAVENOUS CONTINUOUS PRN
Status: DISCONTINUED | OUTPATIENT
Start: 2022-01-23 | End: 2022-01-25 | Stop reason: HOSPADM

## 2022-01-23 RX ORDER — BUPIVACAINE HYDROCHLORIDE 2.5 MG/ML
INJECTION, SOLUTION EPIDURAL; INFILTRATION; INTRACAUDAL PRN
Status: DISCONTINUED | OUTPATIENT
Start: 2022-01-23 | End: 2022-01-23

## 2022-01-23 RX ORDER — NALOXONE HYDROCHLORIDE 0.4 MG/ML
0.4 INJECTION, SOLUTION INTRAMUSCULAR; INTRAVENOUS; SUBCUTANEOUS
Status: DISCONTINUED | OUTPATIENT
Start: 2022-01-23 | End: 2022-01-23

## 2022-01-23 RX ORDER — OXYTOCIN/0.9 % SODIUM CHLORIDE 30/500 ML
100-340 PLASTIC BAG, INJECTION (ML) INTRAVENOUS CONTINUOUS PRN
Status: DISCONTINUED | OUTPATIENT
Start: 2022-01-23 | End: 2022-01-23

## 2022-01-23 RX ORDER — OXYTOCIN/0.9 % SODIUM CHLORIDE 30/500 ML
340 PLASTIC BAG, INJECTION (ML) INTRAVENOUS CONTINUOUS PRN
Status: DISCONTINUED | OUTPATIENT
Start: 2022-01-23 | End: 2022-01-23

## 2022-01-23 RX ORDER — BISACODYL 10 MG
10 SUPPOSITORY, RECTAL RECTAL DAILY PRN
Status: DISCONTINUED | OUTPATIENT
Start: 2022-01-23 | End: 2022-01-25 | Stop reason: HOSPADM

## 2022-01-23 RX ORDER — IBUPROFEN 600 MG/1
600 TABLET, FILM COATED ORAL
Status: DISCONTINUED | OUTPATIENT
Start: 2022-01-23 | End: 2022-01-23

## 2022-01-23 RX ORDER — FENTANYL/BUPIVACAINE/NS/PF 2-1250MCG
PLASTIC BAG, INJECTION (ML) INJECTION
Status: COMPLETED
Start: 2022-01-23 | End: 2022-01-23

## 2022-01-23 RX ORDER — PROCHLORPERAZINE 25 MG
25 SUPPOSITORY, RECTAL RECTAL EVERY 12 HOURS PRN
Status: DISCONTINUED | OUTPATIENT
Start: 2022-01-23 | End: 2022-01-23

## 2022-01-23 RX ORDER — MISOPROSTOL 200 UG/1
800 TABLET ORAL
Status: DISCONTINUED | OUTPATIENT
Start: 2022-01-23 | End: 2022-01-23 | Stop reason: HOSPADM

## 2022-01-23 RX ORDER — ONDANSETRON 2 MG/ML
4 INJECTION INTRAMUSCULAR; INTRAVENOUS EVERY 6 HOURS PRN
Status: DISCONTINUED | OUTPATIENT
Start: 2022-01-23 | End: 2022-01-23 | Stop reason: HOSPADM

## 2022-01-23 RX ORDER — HYDROCORTISONE 2.5 %
CREAM (GRAM) TOPICAL 3 TIMES DAILY PRN
Status: DISCONTINUED | OUTPATIENT
Start: 2022-01-23 | End: 2022-01-25 | Stop reason: HOSPADM

## 2022-01-23 RX ORDER — IBUPROFEN 800 MG/1
800 TABLET, FILM COATED ORAL EVERY 6 HOURS PRN
Status: DISCONTINUED | OUTPATIENT
Start: 2022-01-23 | End: 2022-01-25 | Stop reason: HOSPADM

## 2022-01-23 RX ORDER — OXYTOCIN 10 [USP'U]/ML
10 INJECTION, SOLUTION INTRAMUSCULAR; INTRAVENOUS
Status: DISCONTINUED | OUTPATIENT
Start: 2022-01-23 | End: 2022-01-25 | Stop reason: HOSPADM

## 2022-01-23 RX ORDER — TRANEXAMIC ACID 10 MG/ML
1 INJECTION, SOLUTION INTRAVENOUS EVERY 30 MIN PRN
Status: DISCONTINUED | OUTPATIENT
Start: 2022-01-23 | End: 2022-01-25 | Stop reason: HOSPADM

## 2022-01-23 RX ORDER — IBUPROFEN 600 MG/1
600 TABLET, FILM COATED ORAL
Status: DISCONTINUED | OUTPATIENT
Start: 2022-01-23 | End: 2022-01-24

## 2022-01-23 RX ORDER — TRANEXAMIC ACID 10 MG/ML
1 INJECTION, SOLUTION INTRAVENOUS EVERY 30 MIN PRN
Status: DISCONTINUED | OUTPATIENT
Start: 2022-01-23 | End: 2022-01-23

## 2022-01-23 RX ORDER — OXYTOCIN/0.9 % SODIUM CHLORIDE 30/500 ML
340 PLASTIC BAG, INJECTION (ML) INTRAVENOUS CONTINUOUS PRN
Status: DISCONTINUED | OUTPATIENT
Start: 2022-01-23 | End: 2022-01-23 | Stop reason: HOSPADM

## 2022-01-23 RX ORDER — MISOPROSTOL 200 UG/1
800 TABLET ORAL
Status: DISCONTINUED | OUTPATIENT
Start: 2022-01-23 | End: 2022-01-25 | Stop reason: HOSPADM

## 2022-01-23 RX ORDER — ONDANSETRON 4 MG/1
4 TABLET, ORALLY DISINTEGRATING ORAL EVERY 6 HOURS PRN
Status: DISCONTINUED | OUTPATIENT
Start: 2022-01-23 | End: 2022-01-23

## 2022-01-23 RX ORDER — KETOROLAC TROMETHAMINE 30 MG/ML
30 INJECTION, SOLUTION INTRAMUSCULAR; INTRAVENOUS
Status: DISCONTINUED | OUTPATIENT
Start: 2022-01-23 | End: 2022-01-24

## 2022-01-23 RX ORDER — MISOPROSTOL 200 UG/1
400 TABLET ORAL
Status: DISCONTINUED | OUTPATIENT
Start: 2022-01-23 | End: 2022-01-25 | Stop reason: HOSPADM

## 2022-01-23 RX ORDER — LIDOCAINE 40 MG/G
CREAM TOPICAL
Status: DISCONTINUED | OUTPATIENT
Start: 2022-01-23 | End: 2022-01-23 | Stop reason: HOSPADM

## 2022-01-23 RX ORDER — CARBOPROST TROMETHAMINE 250 UG/ML
250 INJECTION, SOLUTION INTRAMUSCULAR
Status: DISCONTINUED | OUTPATIENT
Start: 2022-01-23 | End: 2022-01-25 | Stop reason: HOSPADM

## 2022-01-23 RX ORDER — NALBUPHINE HYDROCHLORIDE 10 MG/ML
2.5-5 INJECTION, SOLUTION INTRAMUSCULAR; INTRAVENOUS; SUBCUTANEOUS EVERY 6 HOURS PRN
Status: DISCONTINUED | OUTPATIENT
Start: 2022-01-23 | End: 2022-01-24

## 2022-01-23 RX ORDER — METOCLOPRAMIDE 10 MG/1
10 TABLET ORAL EVERY 6 HOURS PRN
Status: DISCONTINUED | OUTPATIENT
Start: 2022-01-23 | End: 2022-01-23

## 2022-01-23 RX ORDER — ONDANSETRON 4 MG/1
4 TABLET, ORALLY DISINTEGRATING ORAL EVERY 6 HOURS PRN
Status: DISCONTINUED | OUTPATIENT
Start: 2022-01-23 | End: 2022-01-23 | Stop reason: HOSPADM

## 2022-01-23 RX ORDER — CARBOPROST TROMETHAMINE 250 UG/ML
250 INJECTION, SOLUTION INTRAMUSCULAR
Status: DISCONTINUED | OUTPATIENT
Start: 2022-01-23 | End: 2022-01-23 | Stop reason: HOSPADM

## 2022-01-23 RX ORDER — METOCLOPRAMIDE 10 MG/1
10 TABLET ORAL EVERY 6 HOURS PRN
Status: DISCONTINUED | OUTPATIENT
Start: 2022-01-23 | End: 2022-01-23 | Stop reason: HOSPADM

## 2022-01-23 RX ORDER — MODIFIED LANOLIN
OINTMENT (GRAM) TOPICAL
Status: DISCONTINUED | OUTPATIENT
Start: 2022-01-23 | End: 2022-01-25 | Stop reason: HOSPADM

## 2022-01-23 RX ORDER — OXYTOCIN 10 [USP'U]/ML
10 INJECTION, SOLUTION INTRAMUSCULAR; INTRAVENOUS
Status: DISCONTINUED | OUTPATIENT
Start: 2022-01-23 | End: 2022-01-24

## 2022-01-23 RX ORDER — MISOPROSTOL 200 UG/1
800 TABLET ORAL
Status: DISCONTINUED | OUTPATIENT
Start: 2022-01-23 | End: 2022-01-23

## 2022-01-23 RX ORDER — PROCHLORPERAZINE 25 MG
25 SUPPOSITORY, RECTAL RECTAL EVERY 12 HOURS PRN
Status: DISCONTINUED | OUTPATIENT
Start: 2022-01-23 | End: 2022-01-23 | Stop reason: HOSPADM

## 2022-01-23 RX ADMIN — LIDOCAINE HYDROCHLORIDE,EPINEPHRINE BITARTRATE 3 ML: 15; .005 INJECTION, SOLUTION EPIDURAL; INFILTRATION; INTRACAUDAL; PERINEURAL at 17:24

## 2022-01-23 RX ADMIN — SODIUM CHLORIDE, POTASSIUM CHLORIDE, SODIUM LACTATE AND CALCIUM CHLORIDE 1000 ML: 600; 310; 30; 20 INJECTION, SOLUTION INTRAVENOUS at 17:01

## 2022-01-23 RX ADMIN — Medication: at 17:40

## 2022-01-23 RX ADMIN — Medication 340 ML/HR: at 19:48

## 2022-01-23 RX ADMIN — DOCUSATE SODIUM 100 MG: 100 CAPSULE, LIQUID FILLED ORAL at 21:30

## 2022-01-23 RX ADMIN — BUPIVACAINE HYDROCHLORIDE 10 ML: 2.5 INJECTION, SOLUTION EPIDURAL; INFILTRATION; INTRACAUDAL at 17:33

## 2022-01-23 RX ADMIN — SODIUM CHLORIDE, POTASSIUM CHLORIDE, SODIUM LACTATE AND CALCIUM CHLORIDE: 600; 310; 30; 20 INJECTION, SOLUTION INTRAVENOUS at 18:57

## 2022-01-23 RX ADMIN — LIDOCAINE HYDROCHLORIDE,EPINEPHRINE BITARTRATE 2 ML: 15; .005 INJECTION, SOLUTION EPIDURAL; INFILTRATION; INTRACAUDAL; PERINEURAL at 17:28

## 2022-01-23 RX ADMIN — KETOROLAC TROMETHAMINE 30 MG: 30 INJECTION, SOLUTION INTRAMUSCULAR; INTRAVENOUS at 20:19

## 2022-01-23 ASSESSMENT — ACTIVITIES OF DAILY LIVING (ADL)
ADLS_ACUITY_SCORE: 3
ADLS_ACUITY_SCORE: 12
ADLS_ACUITY_SCORE: 3

## 2022-01-23 NOTE — ANESTHESIA PROCEDURE NOTES
Epidural catheter Procedure Note    Pre-Procedure   Staff -        Anesthesiologist:  Jordin Cast MD       Performed By: anesthesiologist       Referred By: Nash       Location: OB       Procedure Start/Stop Times: 1/23/2022 5:43 PM       Pre-Anesthestic Checklist: patient identified, IV checked, risks and benefits discussed, informed consent, monitors and equipment checked, pre-op evaluation and at physician/surgeon's request  Timeout:       Correct Patient: Yes        Correct Procedure: Yes        Correct Site: Yes        Correct Position: Yes   Procedure Documentation  Procedure: epidural catheter       Diagnosis: labor       Patient Position: sitting       Patient Prep/Sterile Barriers: sterile gloves, mask, patient draped       Skin prep: Betadine       Local skin infiltrated with 3 mL of 1% lidocaine.        Insertion Site: L3-4. (midline approach).       Technique: LORT saline        SEAN at 6 cm.       Needle Type: 120 Sportsy needle       Needle Gauge: 17.        Needle Length (Inches): 3.5        Catheter: 19 G.         Catheter threaded easily.         Threaded 12 cm at skin.         # of attempts: 1 and  # of redirects:  2    Assessment/Narrative         Paresthesias: No.      Test dose of 3 mL lidocaine 1.5% w/ 1:200,000 epinephrine at.         Test dose negative, 3 minutes after injection, for signs of intravascular, subdural, or intrathecal injection.       Insertion/Infusion Method: LORT saline       Aspiration negative for Heme or CSF via Epidural Catheter.     Comments:  Procedure tolerated well without apparent complications. Initial MDA bolus of 0.25% bupivacaine given. Epidural infusion (0.125% bupi with fentanyl 2mcg/ml) started at 10 ml/hr with PCEA of 5 ml q15min with a max cumulative dose of 25 ml/hr. PCEA instructions given and use encouraged PRN. Epidural expectations again reviewed and questions answered. Patient hemodynamically stable.  Patient and epidural functionality to be  reassessed later via vital sign flowsheet, nursing communication, and/or patient report.  Anesthesiologist immediately available for ongoing assessment and management.

## 2022-01-23 NOTE — TELEPHONE ENCOUNTER
Patient's  calling - verbal consent given by patient    Patient is 39 weeks.  Says her water broke about 2:00 pm.  Second baby.   Contractions every 3-6 minutes     Estimated Date of Delivery: 2022    3:28 pm called [place] page operators (Killian) to place page to on-call midwife, Vicky Cao CNM.  Per Vicky Cao CNM - okay to send patient in to L & D now.    3:34 pm called [facility] L & D, spoke with charge nurse, Princess, and relayed patient information.    3:34 pm called patient back and advised to head to L & D at [facility] now.    Wendy Zapata, RN  Triage Nurse Advisor      OB Triage Call      Is patient's OB/Midwife with the [facility]? [facility]- Proceed with triage     Reason for call: in labor    Assessment: Patient is 39 weeks.  Says her water broke about 2:00 pm.  Second baby.   Contractions every 3-6 minutes.  No severe abdominal pain that is constant.  No bleeding.        Plan: Triaged to disposition of Go to L & D Now    Patient plans to deliver at [facility]     Patient's primary OB Provider is [facility].      Per protocol recommendations Patient to be evaluated in L&D. Patient's primary OB is [facility]. Paged on-call midwife for patient's primary OB clinic (refer to where patient is seen as midwives may go to multiple locations) Vicky Cao to call FNA back at [phone].  Call returned at 3:32 pm and advised on triage assessment. Does midwife recommend L&D evaluation? Yes-  Labor and delivery at [facility] ([phone]) notified of patient's pending arrival. Patient notified to go to L&D by RN     Is patient's delivering hospital on divert? No      39w3d    Estimated Date of Delivery: 2022        OB History    Para Term  AB Living   2 1 1 0 0 1   SAB IAB Ectopic Multiple Live Births   0 0 0 0 1      # Outcome Date GA Lbr Jaciel/2nd Weight Sex Delivery Anes PTL Lv   2 Current            1 Term 20 38w6d 06:10 / 04:13 3.4 kg  "(7 lb 7.9 oz) M Vag-Vacuum EPI, Local N PROMISE      Complications: Failure to Progress in Second Stage      Name: SUNITA SOTO      Apgar1: 8  Apgar5: 9       Lab Results   Component Value Date    GBS Negative 2019          Wendy Zapata RN 22 3:23 PM  [facility] Nurse Advisor    Reason for Disposition    [1] History of prior delivery (multipara) AND [2] contractions < 10 minutes apart AND [3] present 1 hour    Additional Information    Negative: Difficult to awaken or acting confused (e.g., disoriented, slurred speech)    Negative: Shock suspected (e.g., cold/pale/clammy skin, too weak to stand, low BP, rapid pulse)    Negative: Passed out (i.e., lost consciousness, collapsed and was not responding)    Negative: [1] SEVERE abdominal pain (e.g., excruciating) AND [2] constant AND [3] present > 1 hour    Negative: Severe bleeding (e.g., continuous red blood from vagina, or large blood clots)    Negative: Umbilical cord hanging out of the vagina (shiny, white, curled appearance, \"like telephone cord\")    Negative: Uncontrollable urge to push (i.e., feels like baby is coming out now)    Negative: Can see baby    Negative: Sounds like a life-threatening emergency to the triager    Negative: Pregnant < 37 weeks (i.e., )    Negative: [1] Uncertain delivery date AND [2] possibly pregnant < 37 weeks (i.e., )    Protocols used: PREGNANCY - LABOR-A-      "

## 2022-01-23 NOTE — ANESTHESIA PREPROCEDURE EVALUATION
Anesthesia Pre-Procedure Evaluation    Patient: Rivka Xie   MRN: [id number] : 1985        Preoperative Diagnosis: * No pre-op diagnosis entered *    Procedure : * No procedures listed *          Past Medical History:   Diagnosis Date     Cervical high risk HPV (human papillomavirus) test positive 04/15/2019    See problem list      Past Surgical History:   Procedure Laterality Date     FRACTURE TX, WRIST RT/LT       TONSILLECTOMY & ADENOIDECTOMY        No Known Allergies   Social History     Tobacco Use     Smoking status: Never Smoker     Smokeless tobacco: Never Used   Substance Use Topics     Alcohol use: Not Currently      Wt Readings from Last 1 Encounters:   22 118.9 kg (262 lb 1.6 oz)        Anesthesia Evaluation   Pt has had prior anesthetic. Type: Regional.        ROS/MED HX  ENT/Pulmonary:  - neg pulmonary ROS     Neurologic:  - neg neurologic ROS     Cardiovascular:  - neg cardiovascular ROS     METS/Exercise Tolerance:     Hematologic:  - neg hematologic  ROS     Musculoskeletal:  - neg musculoskeletal ROS     GI/Hepatic:  - neg GI/hepatic ROS     Renal/Genitourinary:  - neg Renal ROS     Endo:     (+) Obesity,     Psychiatric/Substance Use:  - neg psychiatric ROS     Infectious Disease:  - neg infectious disease ROS     Malignancy:       Other:            Physical Exam    Airway        Mallampati: II   TM distance: > 3 FB   Neck ROM: full   Mouth opening: > 3 cm    Respiratory Devices and Support         Dental  no notable dental history         Cardiovascular          Rhythm and rate: regular and normal     Pulmonary   pulmonary exam normal                OUTSIDE LABS:  CBC:   Lab Results   Component Value Date    WBC 8.3 2021    WBC 8.4 2021    HGB 12.3 2021    HGB 10.8 (L) 2021    HCT 37.0 2021    HCT 33.3 (L) 2021     2021     2021     BMP:   Lab Results   Component Value Date     04/15/2019    POTASSIUM 4.2  04/15/2019    CHLORIDE 111 (H) 04/15/2019    CO2 25 04/15/2019    BUN 11 04/15/2019    CR 0.76 04/15/2019    GLC 98 04/15/2019     COAGS: No results found for: PTT, INR, FIBR  POC: No results found for: BGM, HCG, HCGS  HEPATIC:   Lab Results   Component Value Date    ALBUMIN 3.7 04/15/2019    PROTTOTAL 7.2 04/15/2019    ALT 30 04/15/2019    AST 22 04/15/2019    ALKPHOS 72 04/15/2019    BILITOTAL 0.4 04/15/2019     OTHER:   Lab Results   Component Value Date    A1C 5.3 04/15/2019    SAW 9.1 04/15/2019    TSH 2.42 04/15/2019       Anesthesia Plan    ASA Status:  3   - Procedure: Procedure only, no anesthetic delivered      Anesthesia Type: Epidural.              Consents    Anesthesia Plan(s) and associated risks, benefits, and realistic alternatives discussed. Questions answered and patient/representative(s) expressed understanding.    - Discussed:     - Discussed with:  Patient         Postoperative Care            Comments:    Other Comments: Continuous Labor Epidural: Indication is for labor pain. Following an discussion of the procedure, epidural expectations, and risks and benefits (risks including, but not limited to, nerve damage, infection, bleeding/hematoma, inadvertent dural puncture, spinal headache, partial or failed block), the patient appears to understand and consents to proceed. Questions were encouraged and answered.             Jordin Cast MD

## 2022-01-23 NOTE — PROGRESS NOTES
"CNM PROGRESS NOTE    SUBJECTIVE:  Patient now resting comfortably with epidural. Patient states she feels \"much better now.\"     OBJECTIVE:  /65   Pulse 71   Temp 97.4  F (36.3  C) (Oral)   Resp 20   LMP 2021   SpO2 99%     Fetal heart tones: Baseline 140   Variability: moderate  Accelerations: present  Decelerations: absent    Contractions: Pt is kenneth every 2-3 minutes, lasting 60-80 seconds and palpates moderate    Cervix: 4.5/90/-1 from initial SVE, not reexamined  ROM: clear fluid    Pitocin- none  Antibiotics- none  Cervical ripening: N/A    ASSESSMENT:  IUP @ 39w3d active labor   GBS- negative  Cat I FHR tracing     PLAN:   Routine CNM care  Comfort measures prn   Pain medication - epidural  Anticipate   Reevaluate in 2-4 hours/PRN    PARISH Coburn APRN, BEVERLY    I was present with the student who participated in the service and documentation of the services provided. I have verified the history and personally performed the physical exam and medical decision making as documented by the student and edited by me.      "

## 2022-01-23 NOTE — H&P
"[facility] Labor Admission History & Physical    Rivka Xie is a 36 year old  with an IUP at 39w3d  ; ,   Partner/support Person: Andrew  Language Barrier: English  Clinic: [place], [place]  Provider: Vicky Cao CNM    Rivka Xie is admitted to the Birthplace at [facility] on 2022 at 4:56 PM       History of present inllness/Chief Complaint:  Patient presents to Birthplace with uterine contractions and leaking of fluid. States her \"water broke\" at 2pm and contractions started shortly after. She is visibly uncomfortable upon initial assessment and breathing through contractions.     Here with: spontaneous rupture of membranes and active labor  Patient reports contractions are Regular           Baby active Yes  Membranes are grossly ruptured since 2 pm.  Bloody show Yes   Any changes with medical history since last prenatal visit No  Declines syphilis screening; Two negative screens in pregnancy, screening today not indicated.    Obstetrical history  Estimated Date of Delivery: 2022 determined by LMP and confirmed with 1st trimester US.   Patient's last menstrual period was 2021.   Dating U/S: 2021    Fetal anatomic survey: Normal  Placenta: anterior, no evidence of previa    PRENATAL COURSE  Prenatal care began at 10 wks gestation for a total of 13 prenatal visits.  Total wt gain 11lbs   Prenatal Blood Pressure: WNL  Prenatal course was complicated by anemia treated with IV venofer and PO iron  Tdap: up to date  Rhogam: not indicated    Patient Active Problem List   Diagnosis     Cervical high risk HPV (human papillomavirus) test positive     Encounter for triage in pregnant patient     High-risk pregnancy, second trimester     At risk for complication of pregnancy     BMI 36.0-36.9,adult     Multigravida of advanced maternal age in third trimester     Anemia affecting pregnancy in third trimester     Indication for care in labor or " delivery       HISTORY  No Known Allergies  Past Medical History:   Diagnosis Date     Cervical high risk HPV (human papillomavirus) test positive 04/15/2019    See problem list     Past Surgical History:   Procedure Laterality Date     FRACTURE TX, WRIST RT/LT       TONSILLECTOMY & ADENOIDECTOMY       Family History   Adopted: Yes   Problem Relation Age of Onset     Diabetes Other         was told H/O DM  birth mothers grandparents     Social History     Tobacco Use     Smoking status: Never Smoker     Smokeless tobacco: Never Used   Substance Use Topics     Alcohol use: Not Currently     OB History    Para Term  AB Living   2 1 1 0 0 1   SAB IAB Ectopic Multiple Live Births   0 0 0 0 1      # Outcome Date GA Lbr Jaciel/2nd Weight Sex Delivery Anes PTL Lv   2 Current            1 Term 20 38w6d 06:10 / 04:13 3.4 kg (7 lb 7.9 oz) M Vag-Vacuum EPI, Local N PROMISE      Complications: Failure to Progress in Second Stage      Name: SUNITA SOTO      Apgar1: 8  Apgar5: 9       LABS:  Lab Results   Component Value Date    ABO O 2021    RH Pos 2021    AS Neg 2021    HGB 12.3 2021    HEPBANG Nonreactive 2021    CHPCRT Negative 2021    GCPCRT Negative 2021       GBS Status:   Lab Results   Component Value Date    GBS Negative 2019     Rubella: Immune    HIV: Non-Reactive   Platelets:  207  1 hr GCT: 143, 3 hr GTT: 109    ROS  Pt is alert and oriented  Pt denies significant constitutional symptoms including fever and/or malaise.    Pt denies significant respiratory, cardiovacular, GI, or muscular/skeletal complaints.    Neuro: Denies HA and visual changes  Muscoloskeletal: Denies except for discomforts r/t pregnancy     PHYSICAL EXAM:  LMP 2021   General appearance:  healthy, alert and active   Heart: RRR  Lungs: CTA bilaterally, normal respiratory effort  Abdomen: gravid, single vertex fetus, non-tender, EFW - AGA    Legs: reflexes 2+ bilaterally, no  clonus, no edema   Contractions: Pt is kenneth every 2-3 minutes, lasting 70-90 seconds and palpates moderate-    Fetal heart tones: Baseline 135   Variability: moderate   Accelerations: present  Decelerations: absent    NST: reactive    Cervix: 4.5/ 90% / Mid/ soft/ -1, Vtx  Bloody show: yes   Membranes: SROM, clear at 1400    ASSESSMENT:  36 year old  with cowart IUP 39w3d in active labor  NST reactive  GBS negative and membranes ruptured for 3 hours    PLAN:  Routine CNM care  Labs ordered: Hemoglobin and type and screen  Teaching done r/t comfort measures, pain management options, and labor processes, and plan of care  Admit - see IP orders  Pain medication - epidural per patient as desired  Anticipate     Brisa Jorgensen, ALMA Garcia, BEVERLY    I was present with the student who participated in the service and documentation of the services provided. I have verified the history and personally performed the physical exam and medical decision making as documented by the student and edited by me.

## 2022-01-24 LAB
ERYTHROCYTE [DISTWIDTH] IN BLOOD BY AUTOMATED COUNT: 14.1 % (ref 10–15)
HCT VFR BLD AUTO: 36.1 % (ref 35–47)
HGB BLD-MCNC: 11.5 G/DL (ref 11.7–15.7)
MCH RBC QN AUTO: 30.3 PG (ref 26.5–33)
MCHC RBC AUTO-ENTMCNC: 31.9 G/DL (ref 31.5–36.5)
MCV RBC AUTO: 95 FL (ref 78–100)
PLATELET # BLD AUTO: 197 10E3/UL (ref 150–450)
RBC # BLD AUTO: 3.79 10E6/UL (ref 3.8–5.2)
T PALLIDUM AB SER QL: NONREACTIVE
WBC # BLD AUTO: 12.6 10E3/UL (ref 4–11)

## 2022-01-24 PROCEDURE — 250N000013 HC RX MED GY IP 250 OP 250 PS 637: Performed by: ADVANCED PRACTICE MIDWIFE

## 2022-01-24 PROCEDURE — 120N000001 HC R&B MED SURG/OB

## 2022-01-24 PROCEDURE — 36415 COLL VENOUS BLD VENIPUNCTURE: CPT | Performed by: ADVANCED PRACTICE MIDWIFE

## 2022-01-24 PROCEDURE — 85014 HEMATOCRIT: CPT | Performed by: ADVANCED PRACTICE MIDWIFE

## 2022-01-24 RX ADMIN — IBUPROFEN 800 MG: 800 TABLET, FILM COATED ORAL at 18:49

## 2022-01-24 RX ADMIN — DOCUSATE SODIUM 100 MG: 100 CAPSULE, LIQUID FILLED ORAL at 08:25

## 2022-01-24 RX ADMIN — IBUPROFEN 800 MG: 800 TABLET, FILM COATED ORAL at 02:34

## 2022-01-24 RX ADMIN — IBUPROFEN 800 MG: 800 TABLET, FILM COATED ORAL at 10:33

## 2022-01-24 ASSESSMENT — ACTIVITIES OF DAILY LIVING (ADL)
ADLS_ACUITY_SCORE: 5
ADLS_ACUITY_SCORE: 3
ADLS_ACUITY_SCORE: 3
ADLS_ACUITY_SCORE: 5
ADLS_ACUITY_SCORE: 3
ADLS_ACUITY_SCORE: 5
ADLS_ACUITY_SCORE: 3
ADLS_ACUITY_SCORE: 5
ADLS_ACUITY_SCORE: 3
ADLS_ACUITY_SCORE: 5
ADLS_ACUITY_SCORE: 3
ADLS_ACUITY_SCORE: 5
ADLS_ACUITY_SCORE: 3

## 2022-01-24 NOTE — PLAN OF CARE
VSS, pain controlled with ibuprofen and ice/tucks; breast feeding on and off, talked about 12 to 24 hr expectations, voiding without difficulties, reminded pt to work on PPD and the birth certificate, answered questions.

## 2022-01-24 NOTE — PLAN OF CARE
Data: Vital signs within normal limits. Postpartum checks within normal limits, see flow record. Patient eating and drinking normally. Patient able to empty bladder independently and is up ambulating. No apparent signs of infection.  2nd degree laceration is tender but using Tucks, ice and ibuprofen with relief. Patient performing self cares and is able to care for infant.  Action: Patient medicated during the shift for pain and cramping. See MAR. Patient reassessed within 1 hour after each medication and pain was improved or patient sleeping. Patient education done about safe infant sleep, basic infant cares, breastfeeding cues, positions, and satiety, importance of frequent urination. See flow record. PP and  booklet reviewed with parents and questions answered.   Response: Positive attachment behaviors observed with infant. Support person Andrew present and attentive to patient and infant.   Plan: Anticipate discharge on 22.    Patients mobililty level scored using the bedside mobility assistance tool (BMAT). Patient is at a mobility level test number: 4. Mobility equipment used: none required. Required assist of 1 staff members. Further use of BMAT scoring not required. Patient tolerated transfer and ambulation without difficulty.

## 2022-01-24 NOTE — PROVIDER NOTIFICATION
01/23/22 1818   Provider Notification   Provider Name/Title Vicky PAUL   Method of Notification At Bedside   Request Evaluate in Person   Notification Reason Decels;SVE   Vicky at bedside to assess for prolonged decel 4min down to 60bpm. Repositioned, about to place FSE. SVE 10/100/+1. Scalp stim performed. Decel resolved after scalp stimulation. Patient denies feeling pressure. Baby tolerating ctx. Plan to labor down for an hour before beginning to push.

## 2022-01-24 NOTE — PLAN OF CARE
Data: Rivka Xie transferred to [id number] via wheelchair at 2300. Baby transferred via parent's arms.  Action: Receiving unit notified of transfer: Yes. Patient and family notified of room change. Writer did patient's PP recovery and will continue to care for her overnight. Belongings sent to receiving unit. Accompanied by Registered Nurse. Oriented patient to surroundings. Call light within reach. ID bands double-checked with second nurse.  Response: Patient tolerated transfer and is stable.

## 2022-01-24 NOTE — PROGRESS NOTES
[facility] DEBORAH Progress Note: Postpartum Day #1    2022  11:45 AM    ASSESSMENT:  PPD #1    Doing well.  Hemoglobin   Date Value Ref Range Status   2021 12.3 11.7 - 15.7 g/dL Final   2021 11.5 (L) 11.7 - 15.7 g/dL Final   ]      PLAN:  Continue routine care  Reviewed postpartum blues and postpartum depression warning signs  Reviewed postpartum care plan including 2 week and 6 week visits - scheduling messaged to call and make appts with her.  Anticipated discharge tomorrow 22.        SUBJECTIVE:  Patient is stable and is tolerating acitivity well  Baby is rooming in. Not sure on name yet. Probably Neymar.   Complications since 2 hours post delivery: None  Pain is well controlled.  Patient is taking pain medications.  Breastfeeding status:initiated   Elimination:  She is voiding without difficulty.   Denies heavy bleeding and passing large clots.  Feels great about birth experience.    OBJECTIVE:  /72   Pulse 85   Temp 98  F (36.7  C) (Oral)   Resp 18   LMP 2021   SpO2 99%   Breastfeeding Unknown     Constitutional: healthy, alert and no distress    Breasts: Currently breastfeeding    Fundus: Uterine fundus is firm, non-tender and at 1 cm below the level of the umbilicus    Perineum: Perineum is intact and/or well approximated, minimal swelling    Lochia: Lochia is appropriate for the duration of time since delivery.       ALMA Molina CNM

## 2022-01-24 NOTE — PROVIDER NOTIFICATION
22 1640   Provider Notification   Provider Name/Title Vicky PAUL & Ariane PAUL Student    Method of Notification At Bedside   Request Evaluate in Person   Notification Reason Patient Arrived;SVE;Membrane Status   Data: Patient presented to Birthplace: 2022  4:30 PM.  Reason for maternal/fetal assessment is leaking vaginal fluid. Patient reports SROM at 1430, ctx starting at 1530.  Patient is a .  Prenatal record reviewed. Pregnancy has been uncomplicated..  Gestational Age 39w3d. VSS. Fetal movement active. Patient denies vaginal bleeding, abdominal pain, pelvic pressure, nausea, vomiting, headache, visual disturbances, epigastric or URQ pain, significant edema. Support person is present.   Action: Verbal consent for EFM. Triage assessment completed. Bill of rights reviewed.  Response: Patient verbalized agreement with plan. Will contact BEVERLY Cao with update and for further orders.

## 2022-01-24 NOTE — L&D DELIVERY NOTE
OB Vaginal Delivery Note    Rivka Xie MRN# [id number]   Age: 36 year old YOB: 1985       GA: 39w3d  GP:   Labor Complications: None   EBL:   mL  Delivery QBL: 172 mL  Delivery Type: Vaginal, Spontaneous   ROM to Delivery Time: (Delivered) Hours: 5 Minutes: 13  Lyman Weight:     1 Minute 5 Minute 10 Minute   Apgar Totals: 8   9        RYLIE JONES;SAMMY CHRISTINA;MICHELLE WHEAT;LONNIE DORMAN     Delivery Details:  Rivka Xie, a 36 year old  female delivered a viable infant with apgars of 8  and 9 . Patient was fully dilated and pushing after   hours   minutes in active labor. Delivery was via vaginal, spontaneous  to a sterile field under epidural  anesthesia. Infant delivered in vertex  left  occiput  anterior  position. Anterior and posterior shoulders delivered without difficulty. The cord was clamped, cut twice and 3 vessels  were noted. Cord blood was obtained in routine fashion with the following disposition: lab .      Cord complications: nuchal  easily reduced over fetal head   Placenta delivered at 2022  7:48 PM . Placental disposition was Hospital disposal . Fundal massage performed and fundus found to be firm.     Episiotomy: none    Perineum, vagina, cervix were inspected, and the following lacerations were noted:   Perineal lacerations: 2nd   Any lacerations were repaired in the usual fashion using 3-0 vicryl.    Excellent hemostasis was noted. Needle count correct. Infant and patient in delivery room in good and stable condition.        Jason XieSoilaRivka [[id number]]    Labor Event Times    Labor onset date: 22 Onset time:  3:30 PM   Dilation complete date: 22 Complete time:  6:14 PM   Start pushing date/time: 2022 1910      Labor Events     labor?: No   steroids: None  Labor Type: Spontaneous  Predominate monitoring during 1st stage: continuous electronic fetal monitoring     Antibiotics received during labor?: No      Rupture date/time: 1/23/22 1430   Rupture type: Spontaneous rupture of membranes occuring during spontaneous labor or augmentation  Fluid color: Clear  Fluid odor: Normal     Augmentation: None  1:1 continuous labor support provided by?: RN       Delivery/Placenta Date and Time    Delivery Date: 1/23/22 Delivery Time:  7:43 PM   Placenta Date/Time: 1/23/2022  7:48 PM  Oxytocin given at the time of delivery: after delivery of placenta  Delivering clinician: Brisa Jorgensen   Other personnel present at delivery:  Provider Role   Juju Hogue, RN Delivery Nurse   Kristan Rodriguez, RN Registered Nurse   Vicky Cao CNM Certified Nurse Midwife   Brisa Jogrensen Other (see comments)         Vaginal Counts     Initial count performed by 2 team members:  Two Team Members   Ariane JARAMILLO RN       Needles Suture Needles Sponges (RETIRED) Instruments   Initial counts 2  5    Added to count  1     Relief counts       Final counts             Placed during labor Accounted for at the end of labor   FSE     IUPC     Cervadil                       Apgars    Living status: Living   1 Minute 5 Minute 10 Minute 15 Minute 20 Minute   Skin color: 0  1       Heart rate: 2  2       Reflex irritability: 2  2       Muscle tone: 2  2       Respiratory effort: 2  2       Total: 8  9       Apgars assigned by: JUJU REINA RN     Cord    Vessels: 3 Vessels    Cord Complications: Nuchal   Nuchal Intervention: reduced         Nuchal cord description: loose nuchal cord         Cord Blood Disposition: Lab    Gases Sent?: No    Delayed cord clamping?: Yes    Cord Clamping Delay (seconds):  seconds    Stem cell collection?: No       Skin to Skin and Feeding Plan    Skin to skin initiation date/time: 1/1/1841    Skin to skin with: Mother  Skin to skin end date/time:        Labor Events and Shoulder Dystocia    Fetal Tracing Prior to Delivery: Category 1  Shoulder dystocia present?: Neg     Delivery (Maternal) (Provider to Complete)  ([id number])    Episiotomy: None  Perineal lacerations: 2nd    Repair suture: 3-0 Vicryl  Number of repair packets: 1  Genital tract inspection done: Pos     Blood Loss  Mother: Rivka Xie #[id number]   Start of Mother's Information    Delivery Blood Loss  01/23/22 1530 - 01/23/22 2013    Delivery QBL (mL) Hospital Encounter 172 mL    Total  172 mL         End of Mother's Information  Mother: Rivka Xie #[id number]          Delivery - Provider to Complete ([id number])    Delivering clinician: Brisa Jorgensen CNM Care: Exclusive CNM care in labor  Attempted Delivery Types (Choose all that apply): Spontaneous Vaginal Delivery  Delivery Type (Choose the 1 that will go to the Birth History): Vaginal, Spontaneous                   Other personnel:  Provider Role   Juju Hogue, RN Delivery Nurse   Kristan Rodriguez RN Registered Nurse   Vicky Cao CNM Certified Nurse Midwife   Brisa Jorgensen Other (see comments)                Placenta    Date/Time: 1/23/2022  7:48 PM  Removal: Spontaneous  Disposition: Hospital disposal           Anesthesia    Method: Epidural  Cervical dilation at placement: 4-7                Presentation and Position    Presentation: Vertex    Position: Left Occiput Anterior                 PARISH Coburn APRN, CNM  I was present with the student who participated in the service and documentation of the services provided. I have verified the history and personally performed the physical exam and medical decision making as documented by the student and edited by me.

## 2022-01-24 NOTE — PROGRESS NOTES
BEVERLY PROGRESS NOTE    SUBJECTIVE:    Paged by RN to present to bedside d/t FHT deceleration.   Once arrived at bedside, FHTs had improved back to baseline of 135 with maternal repositioning, fetal scalp stim, and IV fluids.     OBJECTIVE:  /65   Pulse 71   Temp 97.4  F (36.3  C) (Oral)   Resp 20   LMP 2021   SpO2 99%     Fetal heart tones: Baseline 145   Variability: moderate  Accelerations: absent  Decelerations: present   FHTs with prolonged deceleration down to 60s x4 minutes, followed by variable deceleratoin  Now improved with baseline 145  Contractions: Pt is kenneth every 3 minutes, lasting 80 seconds and palpates strong    Cervix: 10cm / 100% / +1 per RN, Vtx  ROM: clear fluid    Pitocin- none  Antibiotics- none  Cervical ripening: N/A    ASSESSMENT:  IUP @ 39w3d active labor and good progress   GBS- negative  Prolonged deceleration improved with maternal repositioning. FHTs now category I.      PLAN:   Discussed option to begin pushing v labor down x1 hr or until feeling the urge to push. Rivka would prefer to labor down for an hour with the peanut ball. She feels she started pushing too early with her first, so would like to wait for more of an urge this time.    Anticipate   reevaluate in 1 hour/PRN    Vicky Cao CNM

## 2022-01-24 NOTE — ANESTHESIA POSTPROCEDURE EVALUATION
Patient: Rivka Xie    Procedure: * No procedures listed *       Diagnosis:* No pre-op diagnosis entered *  Diagnosis Additional Information: No value filed.    Anesthesia Type:  Epidural    Note:     Postop Pain Control:    PONV:    Neuro/Psych:    Airway/Respiratory:    CV/Hemodynamics:    Other NRE:    DID A NON-ROUTINE EVENT OCCUR?     Event details/Postop Comments:      S/P epidural for labor.   I or my partner was immediately available for management of this patient during epidural analgesia infusion.  VSS.  Doing well. Block resolved.  Neuro at baseline. Denies positional headache. Minimal side effects easily managed w/ PRN meds. No apparent anesthetic complications. No follow-up required.    Killian Rodriguez MD             Last vitals:  Vitals Value Taken Time   BP     Temp     Pulse     Resp     SpO2         Electronically Signed By: Killian Rodriguez MD  January 24, 2022  11:39 AM

## 2022-01-25 VITALS
RESPIRATION RATE: 18 BRPM | SYSTOLIC BLOOD PRESSURE: 123 MMHG | HEART RATE: 87 BPM | TEMPERATURE: 97.6 F | OXYGEN SATURATION: 99 % | DIASTOLIC BLOOD PRESSURE: 79 MMHG

## 2022-01-25 PROCEDURE — 250N000013 HC RX MED GY IP 250 OP 250 PS 637: Performed by: ADVANCED PRACTICE MIDWIFE

## 2022-01-25 RX ADMIN — IBUPROFEN 800 MG: 800 TABLET, FILM COATED ORAL at 02:15

## 2022-01-25 RX ADMIN — IBUPROFEN 800 MG: 800 TABLET, FILM COATED ORAL at 09:33

## 2022-01-25 RX ADMIN — DOCUSATE SODIUM 100 MG: 100 CAPSULE, LIQUID FILLED ORAL at 09:33

## 2022-01-25 ASSESSMENT — ACTIVITIES OF DAILY LIVING (ADL)
ADLS_ACUITY_SCORE: 3

## 2022-01-25 NOTE — PLAN OF CARE
Patient meeting expected goals. Walking halls this shift. Bonding well with . Able to do all self cares and cares for . Using ibuprofen for pain. Education taught to patient and patient verbalized understanding.

## 2022-01-25 NOTE — PLAN OF CARE
VSS. Up independent in room, voiding without difficultly. Pain managed with ibuprofen. Breastfeeding well. Bonding well with infant. FOB supportive nad involved in cares

## 2022-01-25 NOTE — LACTATION NOTE
This note was copied from a baby's chart.  Lactation in to see patient, second baby breastfeeding. Nursed her first without concerns.  Rivka has large nipples, assisted with getting a deep latch. Nutritive sucks at breast. Reviewed importance of wide latch. Encouraged feeding every 2-3 hours. Will check with insurance to get a spectra pump for home.

## 2022-01-25 NOTE — PLAN OF CARE
All discharge instructions were reviewed with patient by writer and all questions answered.  Patient is aware to have a 2 week tele-health visit with CNM, and a PP check at 6 weeks. Script issued for breast pump, which patient may fill at a later time.  Patient left unit with infant and all belongings at 1300.

## 2022-01-25 NOTE — DISCHARGE SUMMARY
[facility]    Discharge Summary  Obstetrics    Date of Admission:  2022  Date of Discharge:  2022  Discharging Provider: Yesi Ellison  Date of Service: 22    Discharge Diagnoses   Normal Vaginal Delivery    History of Present Illness   Rivka Xie is a 36 year old female who presented with normal labor    Hospital Course   The patient's hospital course was unremarkable.  She recovered as anticipated and experienced no post-delivery complications.  On discharge, her pain was well controlled. Vaginal bleeding is similar to peak menstrual flow.  Voiding without difficulty.  Ambulating well and tolerating a normal diet.  No fevers.  Breastfeeding with occasional latching issue; baby having normal diapers.  Infant is stable.  She was discharged on post-partum day #2.  Subjective   Pt reports feeling well and ready to go home today.  Normal bleeding and ambulating with ease.  Reports BM today.  Some challenges with latch but happy infant is having normal weight loss.  Will continue to breastfeed and reports an increase in milk supply today.    Objective   VSS  Breasts: Nipples intact  Abdomen: FF and 3 BU  Perineum: Sutures approximated and minimal lochia  Extremities: Normal edema and NT    Post-partum hemoglobin:   Hemoglobin   Date Value Ref Range Status   2022 11.5 (L) 11.7 - 15.7 g/dL Final   2021 11.5 (L) 11.7 - 15.7 g/dL Final           Discharge Disposition   Discharged to home   Condition at discharge: Stable    Primary Care Physician   [facility]    Consultations This Hospital Stay   LACTATION IP CONSULT  ANESTHESIOLOGY IP CONSULT  ANESTHESIOLOGY IP CONSULT  HOME CARE POST PARTUM/ IP CONSULT    Discharge Orders      Activity    Activity as tolerated     Reason for your hospital stay    Maternity care     Follow Up    Follow up with provider in 2 weeks and 6 weeks for post-delivery checks     Breast pump - Manual/Electric    Breast Pump  Documentation:  Manual/Electric Pump: To support adequate breast milk production and nutrition for infant.     I, the undersigned, certify that the above prescribed supplies are medically necessary for this patient and is both reasonable and necessary in reference to accepted standards of medical and necessary in reference to accepted standards of medical practice in the treatment of this patient's condition and is not prescribed as a convenience.     Diet    Resume previous diet     Discharge Medications   Current Discharge Medication List      CONTINUE these medications which have NOT CHANGED    Details   cyanocobalamin (VITAMIN B-12) 100 MCG tablet Take 1,000 mcg by mouth daily      Ferrous Sulfate (IRON PO)       Prenatal Vit-Fe Fumarate-FA (PRENATAL MULTIVITAMIN W/IRON) 27-0.8 MG tablet Take 1 tablet by mouth daily  Qty: 90 tablet, Refills: 3    Associated Diagnoses: Supervision of high risk primigravida in first trimester in patient 35 years or older at time of delivery      vitamin C (ASCORBIC ACID) 250 MG tablet Take 250 mg by mouth daily           Allergies   No Known Allergies     P: Reviewed PP expectations and education  Discussed warning s/sx  F/U in 2wks and 6 wks PP at the clinic        ALMA Medellin CNM

## 2022-01-25 NOTE — DISCHARGE INSTRUCTIONS
Postpartum Vaginal Delivery Instructions  Follow up in 2 weeks for tele-health visit, and then in 6 weeks for Postpartum visit.     Activity       Ask family and friends for help when you need it.    Do not place anything in your vagina for 6 weeks.    You are not restricted on other activities, but take it easy for a few weeks to allow your body to recover from delivery.  You are able to do any activities you feel up to that point.    No driving until you have stopped taking your pain medications (usually two weeks after delivery).     Call your health care provider if you have any of these symptoms:       Increased pain, swelling, redness, or fluid around your stiches from an episiotomy or perineal tear.    A fever above 100.4 F (38 C) with or without chills when placing a thermometer under your tongue.    You soak a sanitary pad with blood within 1 hour, or you see blood clots larger than a golf ball.    Bleeding that lasts more than 6 weeks.    Vaginal discharge that smells bad.    Severe pain, cramping or tenderness in your lower belly area.    A need to urinate more frequently (use the toilet more often), more urgently (use the toilet very quickly), or it burns when you urinate.    Nausea and vomiting.    Redness, swelling or pain around a vein in your leg.    Problems breastfeeding or a red or painful area on your breast.    Chest pain and cough or are gasping for air.    Problems coping with sadness, anxiety, or depression.  If you have any concerns about hurting yourself or the baby, call your provider immediately.     You have questions or concerns after you return home.     Keep your hands clean:  Always wash your hands before touching your perineal area and stitches.  This helps reduce your risk of infection.  If your hands aren't dirty, you may use an alcohol hand-rub to clean your hands. Keep your nails clean and short.

## 2022-02-04 ENCOUNTER — TELEPHONE (OUTPATIENT)
Dept: OBGYN | Facility: CLINIC | Age: 37
End: 2022-02-04
Payer: COMMERCIAL

## 2022-02-04 NOTE — TELEPHONE ENCOUNTER
Form received from: Patient    Form requesting following info/need: FMLA    CORBIN needed?: No    Location of form: Eva's desk    When completed the route for return: Fax  [phone] AND call patient to  original copy

## 2022-02-07 NOTE — TELEPHONE ENCOUNTER
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  Samantha Deleon, CMA

## 2022-03-09 ENCOUNTER — PRENATAL OFFICE VISIT (OUTPATIENT)
Dept: MIDWIFE SERVICES | Facility: CLINIC | Age: 37
End: 2022-03-09
Payer: COMMERCIAL

## 2022-03-09 VITALS — WEIGHT: 239.9 LBS | BODY MASS INDEX: 34.42 KG/M2 | SYSTOLIC BLOOD PRESSURE: 114 MMHG | DIASTOLIC BLOOD PRESSURE: 80 MMHG

## 2022-03-09 DIAGNOSIS — Z86.2 HISTORY OF ANEMIA: ICD-10-CM

## 2022-03-09 DIAGNOSIS — L71.9 ROSACEA: ICD-10-CM

## 2022-03-09 DIAGNOSIS — Z30.011 ENCOUNTER FOR INITIAL PRESCRIPTION OF CONTRACEPTIVE PILLS: ICD-10-CM

## 2022-03-09 LAB — HGB BLD-MCNC: 12.2 G/DL (ref 11.7–15.7)

## 2022-03-09 PROCEDURE — 99207 PR POST PARTUM EXAM: CPT | Performed by: ADVANCED PRACTICE MIDWIFE

## 2022-03-09 PROCEDURE — 36415 COLL VENOUS BLD VENIPUNCTURE: CPT | Performed by: ADVANCED PRACTICE MIDWIFE

## 2022-03-09 PROCEDURE — 85018 HEMOGLOBIN: CPT | Performed by: ADVANCED PRACTICE MIDWIFE

## 2022-03-09 RX ORDER — ACETAMINOPHEN AND CODEINE PHOSPHATE 120; 12 MG/5ML; MG/5ML
0.35 SOLUTION ORAL DAILY
Qty: 84 TABLET | Refills: 4 | Status: SHIPPED | OUTPATIENT
Start: 2022-03-09 | End: 2024-01-10

## 2022-03-09 ASSESSMENT — PATIENT HEALTH QUESTIONNAIRE - PHQ9: SUM OF ALL RESPONSES TO PHQ QUESTIONS 1-9: 0

## 2022-03-09 NOTE — PROGRESS NOTES
Midwife Postpartum 6 Week Visit     Rivka Xie is a 36 year old here for a postpartum checkup.     Delivery date was 2022. She had a  of a viable boy, weight 8 pounds 4 oz., with no complications      Since delivery, she has been breast feeding. She has not had any signs of infection, her lochia stopped after 2 weeks.  She has not had other complications.     She is voiding and having bowel movements without difficulty.      Last Pap was on 3/13/2020: NIL/HPV negative     Contraception was discussed and patient desires mini pill / progesterone only pill.     She  has not had intercourse since delivery. She complains of NA perineal discomfort.     Mood is Stable  Patient screened for postpartum depression.   Depression Rating was:   Last PHQ-9 score on record =   PHQ-9 SCORE 3/9/2022   PHQ-9 Total Score 0   EPDS: 1    ROS:  CONSTITUTIONAL: NEGATIVE for fever, chills, change in weight  INTEGUMENTARU/SKIN: NEGATIVE for worrisome rashes, moles or lesions; POSITIVE for redness of nose and chin (patient reports this first started prior to her first pregnancy in 2019 and has flared up in her pregnancies)  EYES: NEGATIVE for vision changes or irritation  ENT: NEGATIVE for ear, mouth and throat problems  RESP: NEGATIVE for significant cough or SOB  BREAST: NEGATIVE for masses, tenderness or discharge  CV: NEGATIVE for chest pain, palpitations or peripheral edema  GI: NEGATIVE for nausea, abdominal pain, heartburn, or change in bowel habits  : NEGATIVE for unusual urinary or vaginal symptoms. Periods are regular.  MUSCULOSKELETAL: NEGATIVE for significant arthralgias or myalgia  NEURO: NEGATIVE for weakness, dizziness or paresthesias  PSYCHIATRIC: NEGATIVE for changes in mood or affect    Current Outpatient Medications:      cyanocobalamin (VITAMIN B-12) 100 MCG tablet, Take 1,000 mcg by mouth daily, Disp: , Rfl:      Ferrous Sulfate (IRON PO), , Disp: , Rfl:      Prenatal Vit-Fe Fumarate-FA (PRENATAL  MULTIVITAMIN W/IRON) 27-0.8 MG tablet, Take 1 tablet by mouth daily, Disp: 90 tablet, Rfl: 3     vitamin C (ASCORBIC ACID) 250 MG tablet, Take 250 mg by mouth daily, Disp: , Rfl: .   OB History    Para Term  AB Living   2 2 2 0 0 2   SAB IAB Ectopic Multiple Live Births   0 0 0 0 2      # Outcome Date GA Lbr Jaciel/2nd Weight Sex Delivery Anes PTL Lv   2 Term 22 39w3d 02:44 / 01:29 3.73 kg (8 lb 3.6 oz) M Vag-Spont EPI N PROMISE      Name: SUNITA SOTO      Apgar1: 8  Apgar5: 9   1 Term 20 38w6d 06:10 / 04:13 3.4 kg (7 lb 7.9 oz) M Vag-Vacuum EPI, Local N PROMISE      Complications: Failure to Progress in Second Stage      Name: SUNITA SOTO      Apgar1: 8  Apgar5: 9     Last pap:    Lab Results   Component Value Date    PAP NIL 2020     Hgb in hospital was 11.5    EXAM:  LMP 2021   BMI: Body mass index is 34.42 kg/m .  Exam:  Constitutional: healthy, alert and no distress  Head: Normocephalic. No masses, lesions, tenderness or abnormalities  Neck: Neck supple. No adenopathy. Thyroid symmetric, normal size,, Carotids without bruits.  ENT: ENT exam normal, no neck nodes or sinus tenderness  Cardiovascular: negative, PMI normal. No lifts, heaves, or thrills. RRR. No murmurs, clicks gallops or rub  Respiratory: negative, Percussion normal. Good diaphragmatic excursion. Lungs clear  Breasts: normal without suspicious masses, skin changes or axillary nodes, symmetric fibrous changes in both upper outer quadrants, self exam is taught and encouraged. Deferred as patient is lactating  Gastrointestinal: Abdomen soft, non-tender. BS normal. No masses, organomegaly  Musculoskeletal: extremities normal- no gross deformities noted, gait normal and normal muscle tone  Skin: no suspicious lesions or rashes; redness and papules of nose and chin with a likeness to rosacea  Neurologic: Gait normal. Reflexes normal and symmetric. Sensation grossly WNL.  Psychiatric: mentation appears normal and  affect normal/bright  Hematologic/Lymphatic/Immunologic: Normal cervical lymph nodes  PELVIC EXAM:  Vulva: No lesions, well healed, BUS WNL, no tenderness  Vagina: Moist, pink, discharge normal  well rugated, no lesions  Cervix:smooth, pink, no visible lesions  Uterus: Involuted to normal size, non-tender, no masses palpated  Ovaries: No masses palpated  Pelvic tone: good tone  Rectal exam: deferred    ASSESSMENT:   1. Normal postpartum exam after     2. (Z39.2) Routine postpartum follow-up  (primary encounter diagnosis)    3. (Z30.011) Encounter for initial prescription of contraceptive pills  Plan: norethindrone (MICRONOR) 0.35 MG tablet   - Mini-pill for contraception, Rx given for 1 year    4. (L71.9) Rosacea  Plan: metroNIDAZOLE (METROCREAM) 0.75 % external cream   - Will trial Metrocream topically. If redness worsens or does not improve, patient advised to follow up and may consider a referral to Dermatology at that point.    5. (Z86.2) History of anemia  Plan: Recheck Hemoglobin today given anemia in pregnancy. Postpartum Hgb in hospital was stable at 11.5. Patient has been taking PO iron in pregnancy and since delivery. Will follow up with patient on Hgb level and whether or not she needs to continue taking PO iron.     PLAN:  No results found for any visits on 22.    Return as needed or at time of next expected pap, pelvic, or breast exam.  Teaching: exercise and birth control  Family Planning:mini pill / progesterone only pill  Encourage Kegels and abdominal exercise.  Continue a multivitamin/prenatal supplement, especially if breastfeeding.  Pap smear was not obtained today.  Postpartum Hgb was done today.    GDM:  Fasting and 2hr GCT needed:  No  If yes, remind need for annual fasting BG and nutrition/exercise recommendations.    Follow up in 1 year for annual exam and PRN.    PARISH Webb      Patient called about normal hemoglobin result, 12.2. Patient advised that she can discontinue  her PO iron, vitamin B12 and vitamin C. Patient verbalizes understanding and denies any questions at this time.         I was present with the student who participated in the service and documentation of the services provided. I have verified the history and personally performed the physical exam and medical decision making as documented by the student and edited by me.  ALMA Roberson, BEVERLY 3/9/2022 3:04 PM

## 2022-03-09 NOTE — PATIENT INSTRUCTIONS
Patient Education     Rosacea   Rosacea is a long-lasting (chronic) skin condition that often affects the face. In the early stages it causes easy flushing or blushing. Redness may become long-term (permanent) as the small blood vessels of the face widen (dilate). You may have small, red, pus-filled bumps (pustules). It looks like a bad case of acne, and has been called adult acne. But it's not caused by the same things that cause acne.  You will have flare-ups that come and go. They may happen every few weeks or every few months. Experts don't know what causes rosacea. If not treated, it tends to get worse over time.  Some older men often have a more severe form of rosacea (rhinophyma). The oil glands on the skin of the nose become larger and the nose gets bigger. The cheeks also become puffy. Alcohol may make the flushing worse. But this condition is not caused by alcohol use.  Rosacea can be treated with gels and creams for the skin (topical). You may need antibiotic medicine by mouth for more severe cases. You should see improvement in the first 4 weeks. Dilated blood vessels can be treated with a small electric needle or laser surgery. Rhinophyma can be treated with surgery. Or it may be treated by surgically scraping the skin (dermabrasion).  Home care    Don't have foods or drinks that make your face red or flushed. These include hot drinks, spicy foods, caffeine, and alcohol.    Exercise indoors or in a cool area so you won't get overheated.    Limit your sun exposure. Use a sunscreen with at least SPF 30 on your face every day.    Stay out of extreme hot or extreme cold weather.    Don't scrub your face. That will irritate the skin and make the redness worse.    Don't use harsh soaps or moisturizers with irritating ingredients. You may need to use hypoallergenic cosmetics.    Don't use over-the-counter treatments unless your healthcare provider advises it. Some of these treatments may make rosacea  worse.    Try to figure out what triggers your flares. This might be stress, sun exposure, or certain foods.    Follow-up care  Follow up with your healthcare provider, or as advised. Contact your provider if your condition does not get better after taking the medicines you were given. Getting treatment early can stop rosacea from getting worse.  When to seek medical advice  Call your healthcare provider right away if you have redness, burning, or a gritty feeling in your eyes.  [employer] last reviewed this educational content on 11/1/2019 2000-2021 [employer]. All rights reserved. This information is not intended as a substitute for professional medical care. Always follow your healthcare professional's instructions.         Progesterone Only Oral Contraceptive Pills (POPs/Minipill)      Minipills contain only progesterone. Combined pills contain both estrogen and progesterone. Minipills are a great option for women who are breastfeeding or for women who cannot take estrogen. POPs do not increase your risk for blood clots like combined OCP's do. Women who are breastfeeding should call to change contraceptive type when they are done breastfeeding.     How it works:    The minipill effectively prevents pregnancy by actions of the progesterone hormone on various parts of the reproductive system. It makes cervical mucus too thick for sperm to move easily through, it makes the lining of the uterus too thin for a fertilized egg to grow, and it sometimes prevents ovulation all together. The minipill is slightly less effective than combined pills, the pregnancy rate is about 3%.    How to take the minipill:      Minipills come in packs containing several weeks of pills, each pill is marked in the packs so that one pill is taken every day of the week      Unlike combined OCPs there are no placebo pills      Start the first pack of pills on the first day of your menstrual period, if you are postpartum you  can start the pills right away      It is extremely important to take the pill AT THE SAME TIME EVERY DAY (at least within the same hour)      As soon as you finish one pack, start another      If you experience illness such as nausea and vomiting or diarrhea use a backup method for 7 days       Missed/forgotten pills:      If you miss one pill take it as soon as you remember, take your next pill the next day at your usual time and used a backup method like a condom for 7 days if it was more than 3 hours late              If you miss two pills do not take the missed pills, just continue taking your minipill as you normally would, but make sure to use a backup method for the duration of that package, until you start a new one      If you miss more than two pills please contact the clinic      Menstrual changes:      Women taking the minipill often experience short/irregular cycles or may not have a period at all      You may also experience some spotting or bleeding in between your cycles      Contact the clinic if:      You miss one or two pills and then do not get a period      If you have been experiencing normal periods and unexpectedly miss one      If you have any symptoms of pregnancy such as breast tenderness, increased tiredness, or cramping in your lower abdomen      If you have heavy or prolonged bleeding, abdominal pain, fever, or cramps    You can stop taking the minipill if you wish to get pregnant.     Please call with any questions or concerns:    [facility]  [phone]        Patient Education     After Giving Birth: How to Feel Healthy    Helping yourself feel fit is one of the best things you can do for your baby. A little exercise will tone your muscles. You ll feel stronger and more energized. You ll also feel more awake and aware. Don t worry about your weight right now. Your goal is to feel healthy. Part of feeling good is dressing for comfort. If you dress  smart,  you can be a busy new  mom and still look great.  Continue Kegel exercises  You may have been told to do Kegel exercises during pregnancy. These exercises strengthen the muscles that are strained by carrying and delivering the baby. You can return to your Kegels as soon as you feel ready. Why not start today? Squeeze your pelvic floor muscles (the ones that control your urine stream) for at least 5 seconds. Relax, then squeeze again. Work your way up to 50 or 100 Kegels a day.  Exercise often  Exercise helps you get in shape. It also strengthens your muscles, so you are better fit for lifting the baby. As an added benefit, exercise gives you a sense that you re doing something good for yourself. Take your baby for a short walk, or spend 10 minutes stretching. If you were active during pregnancy, you can probably begin light exercise as soon as you feel ready. But be sure to check with your healthcare provider before you begin.  Stay off the scale  For the first month, think about regaining energy and feeling good, not about losing weight. Losing weight too soon can make you feel more tired. Instead, focus on caring for your baby and eating balanced meals. You may lose some weight without even trying, especially if you re breastfeeding. Once your energy level is back to normal, you can begin to lose weight. A gradual weight loss of 4 or 5 pounds (1.8 or 2.27 kg) a month is safest.  Dress smart  You ll want to be comfortable during the first days after delivery. Wear a robe, pajamas, or sweats -- whatever feels best. Soon you may want to look more like your prepregnant self. Do your hair and wear makeup, if you normally do. A loose-fitting dress may feel good. But do yourself a favor: Don t reach for your jeans. It s likely to be a month or more before you can wear them. If leaking breasts are a problem, put pads inside your bra and dress in layers. If you re breastfeeding, shirts that open in front or pullover tops are good choices. A  scarf or shawl can be used as a drape if you breastfeed when others are present.  When to call your healthcare provider  Remember to schedule your postpartum visit. If you delivered by , be seen within 2 weeks. For vaginal delivery, be seen 4 to 6 weeks after the birth. Also, call your healthcare provider if you have:    Heavy bleeding    Fever    Redness or persistent lump in breasts    Inability to void or have a bowel movement after 1 week    Severe pain    Worsening depression  [employer] last reviewed this educational content on 2018-2021 [employer]. All rights reserved. This information is not intended as a substitute for professional medical care. Always follow your healthcare professional's instructions.

## 2022-09-18 ENCOUNTER — HEALTH MAINTENANCE LETTER (OUTPATIENT)
Age: 37
End: 2022-09-18

## 2022-12-12 ENCOUNTER — IMMUNIZATION (OUTPATIENT)
Dept: FAMILY MEDICINE | Facility: CLINIC | Age: 37
End: 2022-12-12
Payer: COMMERCIAL

## 2022-12-12 DIAGNOSIS — Z23 NEED FOR PROPHYLACTIC VACCINATION AND INOCULATION AGAINST INFLUENZA: Primary | ICD-10-CM

## 2022-12-12 PROCEDURE — 99207 PR NO CHARGE NURSE ONLY: CPT

## 2022-12-12 PROCEDURE — 90686 IIV4 VACC NO PRSV 0.5 ML IM: CPT

## 2022-12-12 PROCEDURE — 90471 IMMUNIZATION ADMIN: CPT

## 2023-05-07 ENCOUNTER — HEALTH MAINTENANCE LETTER (OUTPATIENT)
Age: 38
End: 2023-05-07

## 2024-01-10 ENCOUNTER — OFFICE VISIT (OUTPATIENT)
Dept: FAMILY MEDICINE | Facility: CLINIC | Age: 39
End: 2024-01-10
Payer: COMMERCIAL

## 2024-01-10 VITALS
BODY MASS INDEX: 36.71 KG/M2 | HEIGHT: 70 IN | DIASTOLIC BLOOD PRESSURE: 80 MMHG | WEIGHT: 256.4 LBS | OXYGEN SATURATION: 96 % | HEART RATE: 85 BPM | RESPIRATION RATE: 18 BRPM | SYSTOLIC BLOOD PRESSURE: 118 MMHG | TEMPERATURE: 97.8 F

## 2024-01-10 DIAGNOSIS — Z12.4 CERVICAL CANCER SCREENING: ICD-10-CM

## 2024-01-10 DIAGNOSIS — F43.21 GRIEF REACTION: ICD-10-CM

## 2024-01-10 DIAGNOSIS — Z13.220 SCREENING FOR HYPERLIPIDEMIA: ICD-10-CM

## 2024-01-10 DIAGNOSIS — Z13.1 SCREENING FOR DIABETES MELLITUS: ICD-10-CM

## 2024-01-10 DIAGNOSIS — Z00.00 ROUTINE GENERAL MEDICAL EXAMINATION AT A HEALTH CARE FACILITY: Primary | ICD-10-CM

## 2024-01-10 PROBLEM — O09.523 MULTIGRAVIDA OF ADVANCED MATERNAL AGE IN THIRD TRIMESTER: Status: RESOLVED | Noted: 2021-08-31 | Resolved: 2024-01-10

## 2024-01-10 PROBLEM — Z36.89 ENCOUNTER FOR TRIAGE IN PREGNANT PATIENT: Status: RESOLVED | Noted: 2020-01-19 | Resolved: 2024-01-10

## 2024-01-10 PROBLEM — O09.92 HIGH-RISK PREGNANCY, SECOND TRIMESTER: Status: RESOLVED | Noted: 2021-08-31 | Resolved: 2024-01-10

## 2024-01-10 LAB
CHOLEST SERPL-MCNC: 217 MG/DL
FASTING STATUS PATIENT QL REPORTED: YES
FASTING STATUS PATIENT QL REPORTED: YES
GLUCOSE SERPL-MCNC: 103 MG/DL (ref 70–99)
HDLC SERPL-MCNC: 75 MG/DL
LDLC SERPL CALC-MCNC: 122 MG/DL
NONHDLC SERPL-MCNC: 142 MG/DL
TRIGL SERPL-MCNC: 99 MG/DL

## 2024-01-10 PROCEDURE — 80061 LIPID PANEL: CPT | Performed by: NURSE PRACTITIONER

## 2024-01-10 PROCEDURE — 99385 PREV VISIT NEW AGE 18-39: CPT | Performed by: NURSE PRACTITIONER

## 2024-01-10 PROCEDURE — G0145 SCR C/V CYTO,THINLAYER,RESCR: HCPCS | Performed by: NURSE PRACTITIONER

## 2024-01-10 PROCEDURE — 87624 HPV HI-RISK TYP POOLED RSLT: CPT | Performed by: NURSE PRACTITIONER

## 2024-01-10 PROCEDURE — 82947 ASSAY GLUCOSE BLOOD QUANT: CPT | Performed by: NURSE PRACTITIONER

## 2024-01-10 PROCEDURE — 36415 COLL VENOUS BLD VENIPUNCTURE: CPT | Performed by: NURSE PRACTITIONER

## 2024-01-10 ASSESSMENT — ENCOUNTER SYMPTOMS
SORE THROAT: 0
NERVOUS/ANXIOUS: 0
COUGH: 0
MYALGIAS: 0
FEVER: 0
NAUSEA: 0
WEAKNESS: 0
HEADACHES: 0
DIZZINESS: 0
HEARTBURN: 0
FREQUENCY: 0
CONSTIPATION: 0
SHORTNESS OF BREATH: 0
PALPITATIONS: 0
DYSURIA: 0
JOINT SWELLING: 0
ABDOMINAL PAIN: 0
CHILLS: 0
DIARRHEA: 0
PARESTHESIAS: 0
EYE PAIN: 0
ARTHRALGIAS: 0
HEMATURIA: 0
HEMATOCHEZIA: 0

## 2024-01-10 ASSESSMENT — PAIN SCALES - GENERAL: PAINLEVEL: NO PAIN (0)

## 2024-01-10 NOTE — PATIENT INSTRUCTIONS
Preventive Health Recommendations  Female Ages 26 - 39  Yearly exam:   See your health care provider every year in order to  Review health changes.   Discuss preventive care.    Review your medicines if you your doctor has prescribed any.    Until age 30: Get a Pap test every three years (more often if you have had an abnormal result).    After age 30: Talk to your doctor about whether you should have a Pap test every 3 years or have a Pap test with HPV screening every 5 years.   You do not need a Pap test if your uterus was removed (hysterectomy) and you have not had cancer.  You should be tested each year for STDs (sexually transmitted diseases), if you're at risk.   Talk to your provider about how often to have your cholesterol checked.  If you are at risk for diabetes, you should have a diabetes test (fasting glucose).  Shots: Get a flu shot each year. Get a tetanus shot every 10 years.   Nutrition:   Eat at least 5 servings of fruits and vegetables each day.  Eat whole-grain bread, whole-wheat pasta and brown rice instead of white grains and rice.  Get adequate Calcium and Vitamin D.     Lifestyle  Exercise at least 150 minutes a week (30 minutes a day, 5 days of the week). This will help you control your weight and prevent disease.  Limit alcohol to one drink per day.  No smoking.   Wear sunscreen to prevent skin cancer.  See your dentist every six months for an exam and cleaning.      [facility]   Several locations across the [facility]   Phone: [phone]   Website: [email or web address]     [facility]   Several locations throughout [facility]   Phone:  [phone]   Website: [email or web address]     [facility]   [address]   [place] [address]   Phone: [phone]   Website: [email or web address]     [facility]   Several locations throughout [facility]   Phone:  [phone]   Website: [email or web address]     [facility]    Several locations in the southern and eastern [facility]   Phone: [phone]   Website: [email or web address]     [facility]   [address]   [place] [address]   Phone: [phone]   Website: [email or web address]     [facility]   [address] [place] [address]   Phone: [phone]   Website: [email or web address]     [facility]   Several locations   Phone: [phone]   Website: [facility] ([email or web address])

## 2024-01-10 NOTE — PROGRESS NOTES
SUBJECTIVE:   Rivka is a 38 year old, presenting for the following:  Physical (No pap/Breast exam done today )        1/10/2024     8:15 AM   Additional Questions   Roomed by Ashley BONILLA MA   Accompanied by self         1/10/2024     8:15 AM   Patient Reported Additional Medications   Patient reports taking the following new medications none       Healthy Habits:     Getting at least 3 servings of Calcium per day:  Yes    Bi-annual eye exam:  NO    Dental care twice a year:  Yes    Sleep apnea or symptoms of sleep apnea:  None    Diet:  Regular (no restrictions)    Frequency of exercise:  2-3 days/week    Duration of exercise:  15-30 minutes    Taking medications regularly:  Yes    Medication side effects:  None    Additional concerns today:  No    Doing well.   Regular periods, not on birth control.   Considering pregnancy in the future.       Today's PHQ-2 Score:       1/10/2024     8:10 AM   PHQ-2 ( 1999 [employer])   Q1: Little interest or pleasure in doing things 0   Q2: Feeling down, depressed or hopeless 0   PHQ-2 Score 0   Q1: Little interest or pleasure in doing things Not at all   Q2: Feeling down, depressed or hopeless Not at all   PHQ-2 Score 0       Have you ever done Advance Care Planning? (For example, a Health Directive, POLST, or a discussion with a medical provider or your loved ones about your wishes): No, advance care planning information given to patient to review.  Patient declined advance care planning discussion at this time.    Social History     Tobacco Use    Smoking status: Never    Smokeless tobacco: Never   Substance Use Topics    Alcohol use: Not Currently             1/10/2024     8:09 AM   Alcohol Use   Prescreen: >3 drinks/day or >7 drinks/week? No     Reviewed orders with patient.  Reviewed health maintenance and updated orders accordingly - Yes  Labs reviewed in EPIC  BP Readings from Last 3 Encounters:   01/10/24 118/80   03/09/22 114/80   01/25/22 123/79    Wt Readings from Last 3  Encounters:   01/10/24 116.3 kg (256 lb 6.4 oz)   22 108.8 kg (239 lb 14.4 oz)   22 118.9 kg (262 lb 1.6 oz)                  Current Outpatient Medications   Medication Sig Dispense Refill    metroNIDAZOLE (METROCREAM) 0.75 % external cream Apply topically 2 times daily 45 g 1    Prenatal Vit-Fe Fumarate-FA (PRENATAL MULTIVITAMIN W/IRON) 27-0.8 MG tablet Take 1 tablet by mouth daily 90 tablet 3     No Known Allergies    Breast Cancer Screenin/10/2024     8:11 AM   Breast CA Risk Assessment (FHS-7)   Do you have a family history of breast, colon, or ovarian cancer? No / Unknown         Patient under 40 years of age: Routine Mammogram Screening not recommended.   Pertinent mammograms are reviewed under the imaging tab.      History of abnormal Pap smear: YES - age 30- 65 PAP every 3 years recommended      Latest Ref Rng & Units 3/13/2020    11:20 AM 3/13/2020    11:14 AM 4/15/2019     8:22 AM   PAP / HPV   PAP (Historical)   NIL  NIL    HPV 16 DNA NEG^Negative Negative      HPV 18 DNA NEG^Negative Negative      Other HR HPV NEG^Negative Negative        Reviewed and updated as needed this visit by clinical staff   Tobacco  Allergies  Meds              Reviewed and updated as needed this visit by Provider                 Past Medical History:   Diagnosis Date    Cervical high risk HPV (human papillomavirus) test positive 04/15/2019    See problem list      Past Surgical History:   Procedure Laterality Date    FRACTURE TX, WRIST RT/LT      TONSILLECTOMY & ADENOIDECTOMY         Review of Systems   Constitutional:  Negative for chills and fever.   HENT:  Negative for congestion, ear pain, hearing loss and sore throat.    Eyes:  Negative for pain and visual disturbance.   Respiratory:  Negative for cough and shortness of breath.    Cardiovascular:  Negative for chest pain, palpitations and peripheral edema.   Gastrointestinal:  Negative for abdominal pain, constipation, diarrhea, heartburn,  "hematochezia and nausea.   Genitourinary:  Negative for dysuria, frequency, genital sores, hematuria and urgency.   Musculoskeletal:  Negative for arthralgias, joint swelling and myalgias.   Skin:  Negative for rash.   Neurological:  Negative for dizziness, weakness, headaches and paresthesias.   Psychiatric/Behavioral:  Negative for mood changes. The patient is not nervous/anxious.         OBJECTIVE:   /80 (BP Location: Right arm, Patient Position: Sitting, Cuff Size: Adult Regular)   Pulse 85   Temp 97.8  F (36.6  C) (Oral)   Resp 18   Ht 1.778 m (5' 10\")   Wt 116.3 kg (256 lb 6.4 oz)   LMP 01/02/2024   SpO2 96%   BMI 36.79 kg/m    Physical Exam  GENERAL: healthy, alert and no distress  EYES: Eyes grossly normal to inspection, PERRL and conjunctivae and sclerae normal  HENT: ear canals and TM's normal, nose and mouth without ulcers or lesions  NECK: no adenopathy, no asymmetry, masses, or scars and thyroid normal to palpation  RESP: lungs clear to auscultation - no rales, rhonchi or wheezes  BREAST: normal without masses, tenderness or nipple discharge and no palpable axillary masses or adenopathy  CV: regular rate and rhythm, normal S1 S2, no S3 or S4, no murmur, click or rub, no peripheral edema and peripheral pulses strong  ABDOMEN: soft, nontender, no hepatosplenomegaly, no masses and bowel sounds normal   (female): normal female external genitalia, normal urethral meatus, vaginal mucosa pink, moist, well rugated, and normal cervix/adnexa/uterus without masses or discharge  MS: no gross musculoskeletal defects noted, no edema  SKIN: no suspicious lesions or rashes  NEURO: Normal strength and tone, mentation intact and speech normal  PSYCH: mentation appears normal, affect normal/bright    Diagnostic Test Results:  Labs reviewed in Epic    ASSESSMENT/PLAN:   1. Routine general medical examination at a health care facility  Reviewed age appropriate screenings and immunizations.     2. Cervical " "cancer screening    - Pap Screen with HPV - recommended age 30 - 65 years    3. Screening for hyperlipidemia  Fasting, screen  - Lipid panel reflex to direct LDL Fasting; Future  - Lipid panel reflex to direct LDL Fasting    4. Screening for diabetes mellitus  Fasting, screen  - Glucose; Future  - Glucose    5. Grief reaction  Mother passed away from lung cancer 1.5 years ago.  Feeling irritable.  Requesting referral for therapy.  Discussed alternative locations should [facility] be booked out a ways.   - Adult Mental Health  Referral; Future        COUNSELING:  Reviewed preventive health counseling, as reflected in patient instructions      BMI:   Estimated body mass index is 36.79 kg/m  as calculated from the following:    Height as of this encounter: 1.778 m (5' 10\").    Weight as of this encounter: 116.3 kg (256 lb 6.4 oz).   Weight management plan: Discussed healthy diet and exercise guidelines      She reports that she has never smoked. She has never used smokeless tobacco.          ALMA [facility]  "

## 2024-01-12 LAB
BKR LAB AP GYN ADEQUACY: NORMAL
BKR LAB AP GYN INTERPRETATION: NORMAL
BKR LAB AP HPV REFLEX: NORMAL
BKR LAB AP LMP: NORMAL
BKR LAB AP PREVIOUS ABNL DX: NORMAL
BKR LAB AP PREVIOUS ABNORMAL: NORMAL
PATH REPORT.COMMENTS IMP SPEC: NORMAL
PATH REPORT.COMMENTS IMP SPEC: NORMAL
PATH REPORT.RELEVANT HX SPEC: NORMAL

## 2024-01-16 LAB
HUMAN PAPILLOMA VIRUS 16 DNA: NEGATIVE
HUMAN PAPILLOMA VIRUS 18 DNA: NEGATIVE
HUMAN PAPILLOMA VIRUS FINAL DIAGNOSIS: NORMAL
HUMAN PAPILLOMA VIRUS OTHER HR: NEGATIVE

## 2024-01-17 PROBLEM — R87.810 CERVICAL HIGH RISK HPV (HUMAN PAPILLOMAVIRUS) TEST POSITIVE: Status: ACTIVE | Noted: 2019-04-15

## 2024-06-10 NOTE — PROGRESS NOTES
S: Feels well and has no concerns.  Baby active.  Denies uterine cramping, vaginal bleeding or leaking of fluid  O: Vitals: /68 (BP Location: Right arm, Cuff Size: Adult Regular)   Wt 115.2 kg (254 lb)   LMP 04/22/2019 (Exact Date)   BMI 36.97 kg/m    BMI= Body mass index is 36.97 kg/m .  Exam:  Constitutional: healthy, alert and no distress  Respiratory: respirations even and unlabored  Gastrointestinal: Abdomen soft, non-tender. Fundus measures appropriate for gestational age. Fetal heart tones hear without difficulty and within normal limits  : Deferred  Psychiatric: mentation appears normal and affect normal/bright  A:     ICD-10-CM    1. Pregnancy, supervision of first, third trimester Z34.03      P: Discussed GBS screen for next visit. Discussed plans for labor.  Encouraged patient to call with any questions or concerns.  Return to clinic 2 weeks    ALMA Roberson, BEVERLY            
Yes

## 2024-10-29 ENCOUNTER — IMMUNIZATION (OUTPATIENT)
Dept: FAMILY MEDICINE | Facility: CLINIC | Age: 39
End: 2024-10-29
Payer: COMMERCIAL

## 2024-10-29 DIAGNOSIS — Z23 HIGH PRIORITY FOR 2019-NCOV VACCINE: ICD-10-CM

## 2024-10-29 DIAGNOSIS — Z23 NEED FOR PROPHYLACTIC VACCINATION AND INOCULATION AGAINST INFLUENZA: Primary | ICD-10-CM

## 2024-10-29 PROCEDURE — 99207 PR NO CHARGE NURSE ONLY: CPT

## 2024-10-29 PROCEDURE — 90656 IIV3 VACC NO PRSV 0.5 ML IM: CPT

## 2024-10-29 PROCEDURE — 90471 IMMUNIZATION ADMIN: CPT

## 2024-10-29 PROCEDURE — 91320 SARSCV2 VAC 30MCG TRS-SUC IM: CPT

## 2024-10-29 PROCEDURE — 90480 ADMN SARSCOV2 VAC 1/ONLY CMP: CPT

## 2024-12-29 NOTE — NURSING NOTE
Problem: Chronic Conditions and Co-morbidities  Goal: Patient's chronic conditions and co-morbidity symptoms are monitored and maintained or improved  Outcome: Progressing     Problem: Discharge Planning  Goal: Discharge to home or other facility with appropriate resources  Outcome: Progressing     Problem: Skin/Tissue Integrity  Goal: Absence of new skin breakdown  Description: 1.  Monitor for areas of redness and/or skin breakdown  2.  Assess vascular access sites hourly  3.  Every 4-6 hours minimum:  Change oxygen saturation probe site  4.  Every 4-6 hours:  If on nasal continuous positive airway pressure, respiratory therapy assess nares and determine need for appliance change or resting period.  Outcome: Progressing     Problem: Pain  Goal: Verbalizes/displays adequate comfort level or baseline comfort level  Recent Flowsheet Documentation  Taken 12/29/2024 1227 by Galen Dewitt RN  Verbalizes/displays adequate comfort level or baseline comfort level: Encourage patient to monitor pain and request assistance      "Chief Complaint   Patient presents with     Lactation Consult     Delivered: 2020, term, 7 lbs 7.9 oz, Male       Initial /64 (BP Location: Right arm, Cuff Size: Adult Regular)   Wt 105.2 kg (232 lb)   LMP 2019 (Exact Date)   BMI 33.77 kg/m   Estimated body mass index is 33.77 kg/m  as calculated from the following:    Height as of 19: 1.765 m (5' 9.5\").    Weight as of this encounter: 105.2 kg (232 lb).  BP completed using cuff size: regular    Questioned patient about current smoking habits.  Pt. has never smoked.          Reports painful nipples and latching issues. Concerned about how much milk the baby is getting.    Nahun Ozuna, CMA               "

## 2025-01-06 ENCOUNTER — HOSPITAL ENCOUNTER (OUTPATIENT)
Dept: MAMMOGRAPHY | Facility: CLINIC | Age: 40
Discharge: HOME | End: 2025-01-06
Attending: NURSE PRACTITIONER | Admitting: NURSE PRACTITIONER
Payer: COMMERCIAL

## 2025-01-06 DIAGNOSIS — Z12.31 VISIT FOR SCREENING MAMMOGRAM: ICD-10-CM

## 2025-01-06 PROCEDURE — 77063 BREAST TOMOSYNTHESIS BI: CPT

## 2025-04-05 ENCOUNTER — HEALTH MAINTENANCE LETTER (OUTPATIENT)
Age: 40
End: 2025-04-05